# Patient Record
Sex: MALE | Race: WHITE | Employment: FULL TIME | URBAN - METROPOLITAN AREA
[De-identification: names, ages, dates, MRNs, and addresses within clinical notes are randomized per-mention and may not be internally consistent; named-entity substitution may affect disease eponyms.]

---

## 2017-01-05 ENCOUNTER — HOSPITAL ENCOUNTER (OUTPATIENT)
Dept: PREADMISSION TESTING | Age: 57
Discharge: HOME | End: 2017-01-05
Payer: COMMERCIAL

## 2017-01-05 VITALS
TEMPERATURE: 98.5 F | DIASTOLIC BLOOD PRESSURE: 82 MMHG | WEIGHT: 247 LBS | HEART RATE: 58 BPM | HEIGHT: 70 IN | SYSTOLIC BLOOD PRESSURE: 123 MMHG | BODY MASS INDEX: 35.36 KG/M2

## 2017-01-05 LAB
ABO + RH BLD: NORMAL
ANION GAP BLD CALC-SCNC: 6 MMOL/L (ref 5–15)
APPEARANCE UR: CLEAR
ATRIAL RATE: 58 BPM
BACTERIA URNS QL MICRO: NEGATIVE /HPF
BILIRUB UR QL: NEGATIVE
BLOOD GROUP ANTIBODIES SERPL: NORMAL
BUN SERPL-MCNC: 18 MG/DL (ref 6–20)
BUN/CREAT SERPL: 19 (ref 12–20)
CALCIUM SERPL-MCNC: 8.3 MG/DL (ref 8.5–10.1)
CALCULATED P AXIS, ECG09: 24 DEGREES
CALCULATED R AXIS, ECG10: 24 DEGREES
CALCULATED T AXIS, ECG11: 21 DEGREES
CHLORIDE SERPL-SCNC: 107 MMOL/L (ref 97–108)
CO2 SERPL-SCNC: 26 MMOL/L (ref 21–32)
COLOR UR: NORMAL
CREAT SERPL-MCNC: 0.93 MG/DL (ref 0.7–1.3)
DIAGNOSIS, 93000: NORMAL
EPITH CASTS URNS QL MICRO: NORMAL /LPF
ERYTHROCYTE [DISTWIDTH] IN BLOOD BY AUTOMATED COUNT: 14.6 % (ref 11.5–14.5)
EST. AVERAGE GLUCOSE BLD GHB EST-MCNC: 103 MG/DL
GLUCOSE SERPL-MCNC: 110 MG/DL (ref 65–100)
GLUCOSE UR STRIP.AUTO-MCNC: NEGATIVE MG/DL
HBA1C MFR BLD: 5.2 % (ref 4.2–6.3)
HCT VFR BLD AUTO: 45.6 % (ref 36.6–50.3)
HGB BLD-MCNC: 15.3 G/DL (ref 12.1–17)
HGB UR QL STRIP: NEGATIVE
HYALINE CASTS URNS QL MICRO: NORMAL /LPF (ref 0–5)
INR PPP: 1 (ref 0.9–1.1)
KETONES UR QL STRIP.AUTO: NEGATIVE MG/DL
LEUKOCYTE ESTERASE UR QL STRIP.AUTO: NEGATIVE
MCH RBC QN AUTO: 30.1 PG (ref 26–34)
MCHC RBC AUTO-ENTMCNC: 33.6 G/DL (ref 30–36.5)
MCV RBC AUTO: 89.8 FL (ref 80–99)
NITRITE UR QL STRIP.AUTO: NEGATIVE
P-R INTERVAL, ECG05: 136 MS
PH UR STRIP: 7 [PH] (ref 5–8)
PLATELET # BLD AUTO: 163 K/UL (ref 150–400)
POTASSIUM SERPL-SCNC: 4.1 MMOL/L (ref 3.5–5.1)
PROT UR STRIP-MCNC: NEGATIVE MG/DL
PROTHROMBIN TIME: 10.6 SEC (ref 9–11.1)
Q-T INTERVAL, ECG07: 420 MS
QRS DURATION, ECG06: 102 MS
QTC CALCULATION (BEZET), ECG08: 412 MS
RBC # BLD AUTO: 5.08 M/UL (ref 4.1–5.7)
RBC #/AREA URNS HPF: NORMAL /HPF (ref 0–5)
SODIUM SERPL-SCNC: 139 MMOL/L (ref 136–145)
SP GR UR REFRACTOMETRY: 1.02 (ref 1–1.03)
SPECIMEN EXP DATE BLD: NORMAL
UA: UC IF INDICATED,UAUC: NORMAL
UROBILINOGEN UR QL STRIP.AUTO: 0.2 EU/DL (ref 0.2–1)
VENTRICULAR RATE, ECG03: 58 BPM
WBC # BLD AUTO: 6 K/UL (ref 4.1–11.1)
WBC URNS QL MICRO: NORMAL /HPF (ref 0–4)

## 2017-01-05 PROCEDURE — 83036 HEMOGLOBIN GLYCOSYLATED A1C: CPT | Performed by: ORTHOPAEDIC SURGERY

## 2017-01-05 PROCEDURE — 85027 COMPLETE CBC AUTOMATED: CPT | Performed by: ORTHOPAEDIC SURGERY

## 2017-01-05 PROCEDURE — 36415 COLL VENOUS BLD VENIPUNCTURE: CPT | Performed by: ORTHOPAEDIC SURGERY

## 2017-01-05 PROCEDURE — 80048 BASIC METABOLIC PNL TOTAL CA: CPT | Performed by: ORTHOPAEDIC SURGERY

## 2017-01-05 PROCEDURE — 93005 ELECTROCARDIOGRAM TRACING: CPT

## 2017-01-05 PROCEDURE — 86900 BLOOD TYPING SEROLOGIC ABO: CPT | Performed by: ORTHOPAEDIC SURGERY

## 2017-01-05 PROCEDURE — 85610 PROTHROMBIN TIME: CPT | Performed by: ORTHOPAEDIC SURGERY

## 2017-01-05 PROCEDURE — 81001 URINALYSIS AUTO W/SCOPE: CPT | Performed by: ORTHOPAEDIC SURGERY

## 2017-01-05 RX ORDER — OMEGA-3-ACID ETHYL ESTERS 1 G/1
CAPSULE, LIQUID FILLED ORAL DAILY
COMMUNITY
End: 2017-01-25

## 2017-01-05 RX ORDER — IBUPROFEN 200 MG
600 TABLET ORAL
COMMUNITY
End: 2017-01-25

## 2017-01-05 RX ORDER — FAMOTIDINE 20 MG/1
20 TABLET, FILM COATED ORAL AS NEEDED
COMMUNITY
End: 2018-11-20

## 2017-01-05 NOTE — PERIOP NOTES
PREOPERATIVE INSTRUCTIONS REVIEWED WITH PATIENT. PATIENT GIVEN SIX PACKS OF CHG WIPES. INSTRUCTIONS TO BE REVIEWED IN CLASS ON USE OF CHG WIPES. PATIENT GIVEN SSI INFECTION SHEET AND ALSO  MRSA/MSSA TREATMENT INSTRUCTION SHEET  WITH AN EXPLANATION TO PATIENT THAT THEY WILL BE NOTIFIED IF TREATMENT INSTRUCTIONS NEED TO BE INITIATED. PATIENT WAS GIVEN THE OPPORTUNITY TO ASK QUESTIONS ON THE INFORMATION PROVIDED.

## 2017-01-06 LAB
BACTERIA SPEC CULT: NORMAL
BACTERIA SPEC CULT: NORMAL
SERVICE CMNT-IMP: NORMAL

## 2017-01-23 ENCOUNTER — ANESTHESIA EVENT (OUTPATIENT)
Dept: SURGERY | Age: 57
DRG: 470 | End: 2017-01-23
Payer: COMMERCIAL

## 2017-01-23 PROBLEM — M16.9 DEGENERATIVE JOINT DISEASE (DJD) OF HIP: Status: ACTIVE | Noted: 2017-01-23

## 2017-01-24 ENCOUNTER — APPOINTMENT (OUTPATIENT)
Dept: GENERAL RADIOLOGY | Age: 57
DRG: 470 | End: 2017-01-24
Attending: ORTHOPAEDIC SURGERY
Payer: COMMERCIAL

## 2017-01-24 ENCOUNTER — ANESTHESIA (OUTPATIENT)
Dept: SURGERY | Age: 57
DRG: 470 | End: 2017-01-24
Payer: COMMERCIAL

## 2017-01-24 ENCOUNTER — HOSPITAL ENCOUNTER (INPATIENT)
Age: 57
LOS: 1 days | Discharge: HOME HEALTH | DRG: 470 | End: 2017-01-25
Attending: ORTHOPAEDIC SURGERY | Admitting: ORTHOPAEDIC SURGERY
Payer: COMMERCIAL

## 2017-01-24 LAB
ABO + RH BLD: NORMAL
BLOOD GROUP ANTIBODIES SERPL: NORMAL
GLUCOSE BLD STRIP.AUTO-MCNC: 105 MG/DL (ref 65–100)
SERVICE CMNT-IMP: ABNORMAL
SPECIMEN EXP DATE BLD: NORMAL

## 2017-01-24 PROCEDURE — 77030026438 HC STYL ET INTUB CARD -A: Performed by: ANESTHESIOLOGY

## 2017-01-24 PROCEDURE — 73501 X-RAY EXAM HIP UNI 1 VIEW: CPT

## 2017-01-24 PROCEDURE — 76210000017 HC OR PH I REC 1.5 TO 2 HR: Performed by: ORTHOPAEDIC SURGERY

## 2017-01-24 PROCEDURE — 77030011640 HC PAD GRND REM COVD -A: Performed by: ORTHOPAEDIC SURGERY

## 2017-01-24 PROCEDURE — 77030013079 HC BLNKT BAIR HGGR 3M -A: Performed by: ANESTHESIOLOGY

## 2017-01-24 PROCEDURE — 74011250636 HC RX REV CODE- 250/636

## 2017-01-24 PROCEDURE — 74011000250 HC RX REV CODE- 250: Performed by: ORTHOPAEDIC SURGERY

## 2017-01-24 PROCEDURE — 36415 COLL VENOUS BLD VENIPUNCTURE: CPT | Performed by: ORTHOPAEDIC SURGERY

## 2017-01-24 PROCEDURE — 74011250636 HC RX REV CODE- 250/636: Performed by: PHYSICIAN ASSISTANT

## 2017-01-24 PROCEDURE — 82962 GLUCOSE BLOOD TEST: CPT

## 2017-01-24 PROCEDURE — 74011000250 HC RX REV CODE- 250: Performed by: PHYSICIAN ASSISTANT

## 2017-01-24 PROCEDURE — 74011250637 HC RX REV CODE- 250/637: Performed by: PHYSICIAN ASSISTANT

## 2017-01-24 PROCEDURE — 97161 PT EVAL LOW COMPLEX 20 MIN: CPT

## 2017-01-24 PROCEDURE — 77030032490 HC SLV COMPR SCD KNE COVD -B

## 2017-01-24 PROCEDURE — 74011000250 HC RX REV CODE- 250: Performed by: ANESTHESIOLOGY

## 2017-01-24 PROCEDURE — 0SR90JZ REPLACEMENT OF RIGHT HIP JOINT WITH SYNTHETIC SUBSTITUTE, OPEN APPROACH: ICD-10-PCS | Performed by: ORTHOPAEDIC SURGERY

## 2017-01-24 PROCEDURE — 77030018846 HC SOL IRR STRL H20 ICUM -A: Performed by: ORTHOPAEDIC SURGERY

## 2017-01-24 PROCEDURE — 77030006822 HC BLD SAW SAG BRSM -B: Performed by: ORTHOPAEDIC SURGERY

## 2017-01-24 PROCEDURE — 77030034850: Performed by: ORTHOPAEDIC SURGERY

## 2017-01-24 PROCEDURE — 77030035236 HC SUT PDS STRATFX BARB J&J -B: Performed by: ORTHOPAEDIC SURGERY

## 2017-01-24 PROCEDURE — C1776 JOINT DEVICE (IMPLANTABLE): HCPCS | Performed by: ORTHOPAEDIC SURGERY

## 2017-01-24 PROCEDURE — 77030033067 HC SUT PDO STRATFX SPIR J&J -B: Performed by: ORTHOPAEDIC SURGERY

## 2017-01-24 PROCEDURE — 77030020365 HC SOL INJ SOD CL 0.9% 50ML: Performed by: ORTHOPAEDIC SURGERY

## 2017-01-24 PROCEDURE — 74011000258 HC RX REV CODE- 258: Performed by: PHYSICIAN ASSISTANT

## 2017-01-24 PROCEDURE — 77030031139 HC SUT VCRL2 J&J -A: Performed by: ORTHOPAEDIC SURGERY

## 2017-01-24 PROCEDURE — 65270000029 HC RM PRIVATE

## 2017-01-24 PROCEDURE — 77030018836 HC SOL IRR NACL ICUM -A: Performed by: ORTHOPAEDIC SURGERY

## 2017-01-24 PROCEDURE — C9290 INJ, BUPIVACAINE LIPOSOME: HCPCS | Performed by: PHYSICIAN ASSISTANT

## 2017-01-24 PROCEDURE — 77030012935 HC DRSG AQUACEL BMS -B: Performed by: ORTHOPAEDIC SURGERY

## 2017-01-24 PROCEDURE — 74011250636 HC RX REV CODE- 250/636: Performed by: ANESTHESIOLOGY

## 2017-01-24 PROCEDURE — 76000 FLUOROSCOPY <1 HR PHYS/QHP: CPT

## 2017-01-24 PROCEDURE — 86900 BLOOD TYPING SEROLOGIC ABO: CPT | Performed by: ORTHOPAEDIC SURGERY

## 2017-01-24 PROCEDURE — 77030002933 HC SUT MCRYL J&J -A: Performed by: ORTHOPAEDIC SURGERY

## 2017-01-24 PROCEDURE — 76060000036 HC ANESTHESIA 2.5 TO 3 HR: Performed by: ORTHOPAEDIC SURGERY

## 2017-01-24 PROCEDURE — 74011250637 HC RX REV CODE- 250/637: Performed by: ANESTHESIOLOGY

## 2017-01-24 PROCEDURE — 76010000172 HC OR TIME 2.5 TO 3 HR INTENSV-TIER 1: Performed by: ORTHOPAEDIC SURGERY

## 2017-01-24 PROCEDURE — 77030008684 HC TU ET CUF COVD -B: Performed by: ANESTHESIOLOGY

## 2017-01-24 PROCEDURE — 74011250636 HC RX REV CODE- 250/636: Performed by: ORTHOPAEDIC SURGERY

## 2017-01-24 PROCEDURE — 74011000250 HC RX REV CODE- 250

## 2017-01-24 PROCEDURE — 77030012890

## 2017-01-24 PROCEDURE — 97116 GAIT TRAINING THERAPY: CPT

## 2017-01-24 PROCEDURE — 77030034479 HC ADH SKN CLSR PRINEO J&J -B: Performed by: ORTHOPAEDIC SURGERY

## 2017-01-24 RX ORDER — PREGABALIN 75 MG/1
75 CAPSULE ORAL ONCE
Status: COMPLETED | OUTPATIENT
Start: 2017-01-24 | End: 2017-01-24

## 2017-01-24 RX ORDER — DEXAMETHASONE SODIUM PHOSPHATE 4 MG/ML
INJECTION, SOLUTION INTRA-ARTICULAR; INTRALESIONAL; INTRAMUSCULAR; INTRAVENOUS; SOFT TISSUE AS NEEDED
Status: DISCONTINUED | OUTPATIENT
Start: 2017-01-24 | End: 2017-01-24 | Stop reason: HOSPADM

## 2017-01-24 RX ORDER — ONDANSETRON 2 MG/ML
4 INJECTION INTRAMUSCULAR; INTRAVENOUS ONCE
Status: COMPLETED | OUTPATIENT
Start: 2017-01-24 | End: 2017-01-24

## 2017-01-24 RX ORDER — SODIUM CHLORIDE 9 MG/ML
25 INJECTION, SOLUTION INTRAVENOUS CONTINUOUS
Status: DISCONTINUED | OUTPATIENT
Start: 2017-01-24 | End: 2017-01-24 | Stop reason: HOSPADM

## 2017-01-24 RX ORDER — CEFAZOLIN SODIUM IN 0.9 % NACL 2 G/50 ML
2 INTRAVENOUS SOLUTION, PIGGYBACK (ML) INTRAVENOUS EVERY 8 HOURS
Status: COMPLETED | OUTPATIENT
Start: 2017-01-24 | End: 2017-01-24

## 2017-01-24 RX ORDER — SODIUM CHLORIDE 0.9 % (FLUSH) 0.9 %
5-10 SYRINGE (ML) INJECTION EVERY 8 HOURS
Status: DISCONTINUED | OUTPATIENT
Start: 2017-01-25 | End: 2017-01-25 | Stop reason: HOSPADM

## 2017-01-24 RX ORDER — MIDAZOLAM HYDROCHLORIDE 1 MG/ML
INJECTION, SOLUTION INTRAMUSCULAR; INTRAVENOUS AS NEEDED
Status: DISCONTINUED | OUTPATIENT
Start: 2017-01-24 | End: 2017-01-24 | Stop reason: HOSPADM

## 2017-01-24 RX ORDER — DIPHENHYDRAMINE HYDROCHLORIDE 50 MG/ML
12.5 INJECTION, SOLUTION INTRAMUSCULAR; INTRAVENOUS AS NEEDED
Status: DISCONTINUED | OUTPATIENT
Start: 2017-01-24 | End: 2017-01-24 | Stop reason: HOSPADM

## 2017-01-24 RX ORDER — MIDAZOLAM HYDROCHLORIDE 1 MG/ML
0.5 INJECTION, SOLUTION INTRAMUSCULAR; INTRAVENOUS
Status: DISCONTINUED | OUTPATIENT
Start: 2017-01-24 | End: 2017-01-24 | Stop reason: HOSPADM

## 2017-01-24 RX ORDER — PROPOFOL 10 MG/ML
INJECTION, EMULSION INTRAVENOUS AS NEEDED
Status: DISCONTINUED | OUTPATIENT
Start: 2017-01-24 | End: 2017-01-24 | Stop reason: HOSPADM

## 2017-01-24 RX ORDER — OXYCODONE HYDROCHLORIDE 5 MG/1
5 TABLET ORAL
Status: DISCONTINUED | OUTPATIENT
Start: 2017-01-24 | End: 2017-01-25 | Stop reason: HOSPADM

## 2017-01-24 RX ORDER — SODIUM CHLORIDE 0.9 % (FLUSH) 0.9 %
5-10 SYRINGE (ML) INJECTION AS NEEDED
Status: DISCONTINUED | OUTPATIENT
Start: 2017-01-24 | End: 2017-01-25 | Stop reason: HOSPADM

## 2017-01-24 RX ORDER — SODIUM CHLORIDE, SODIUM LACTATE, POTASSIUM CHLORIDE, CALCIUM CHLORIDE 600; 310; 30; 20 MG/100ML; MG/100ML; MG/100ML; MG/100ML
INJECTION, SOLUTION INTRAVENOUS
Status: DISCONTINUED | OUTPATIENT
Start: 2017-01-24 | End: 2017-01-24 | Stop reason: HOSPADM

## 2017-01-24 RX ORDER — ONDANSETRON 2 MG/ML
4 INJECTION INTRAMUSCULAR; INTRAVENOUS
Status: DISCONTINUED | OUTPATIENT
Start: 2017-01-24 | End: 2017-01-25 | Stop reason: HOSPADM

## 2017-01-24 RX ORDER — ACETAMINOPHEN 325 MG/1
650 TABLET ORAL EVERY 6 HOURS
Status: DISCONTINUED | OUTPATIENT
Start: 2017-01-24 | End: 2017-01-25 | Stop reason: HOSPADM

## 2017-01-24 RX ORDER — HYDROXYZINE HYDROCHLORIDE 10 MG/1
10 TABLET, FILM COATED ORAL
Status: DISCONTINUED | OUTPATIENT
Start: 2017-01-24 | End: 2017-01-25 | Stop reason: HOSPADM

## 2017-01-24 RX ORDER — MIDAZOLAM HYDROCHLORIDE 1 MG/ML
1 INJECTION, SOLUTION INTRAMUSCULAR; INTRAVENOUS AS NEEDED
Status: DISCONTINUED | OUTPATIENT
Start: 2017-01-24 | End: 2017-01-24 | Stop reason: HOSPADM

## 2017-01-24 RX ORDER — MORPHINE SULFATE 10 MG/ML
2 INJECTION, SOLUTION INTRAMUSCULAR; INTRAVENOUS
Status: DISCONTINUED | OUTPATIENT
Start: 2017-01-24 | End: 2017-01-24 | Stop reason: HOSPADM

## 2017-01-24 RX ORDER — OXYCODONE HYDROCHLORIDE 5 MG/1
10 TABLET ORAL
Status: DISCONTINUED | OUTPATIENT
Start: 2017-01-24 | End: 2017-01-25 | Stop reason: HOSPADM

## 2017-01-24 RX ORDER — ROCURONIUM BROMIDE 10 MG/ML
INJECTION, SOLUTION INTRAVENOUS AS NEEDED
Status: DISCONTINUED | OUTPATIENT
Start: 2017-01-24 | End: 2017-01-24 | Stop reason: HOSPADM

## 2017-01-24 RX ORDER — PROCHLORPERAZINE EDISYLATE 5 MG/ML
INJECTION INTRAMUSCULAR; INTRAVENOUS
Status: COMPLETED
Start: 2017-01-24 | End: 2017-01-24

## 2017-01-24 RX ORDER — GLYCOPYRROLATE 0.2 MG/ML
INJECTION INTRAMUSCULAR; INTRAVENOUS AS NEEDED
Status: DISCONTINUED | OUTPATIENT
Start: 2017-01-24 | End: 2017-01-24 | Stop reason: HOSPADM

## 2017-01-24 RX ORDER — SODIUM CHLORIDE 0.9 % (FLUSH) 0.9 %
5-10 SYRINGE (ML) INJECTION EVERY 8 HOURS
Status: DISCONTINUED | OUTPATIENT
Start: 2017-01-24 | End: 2017-01-24 | Stop reason: HOSPADM

## 2017-01-24 RX ORDER — LIDOCAINE HYDROCHLORIDE 20 MG/ML
INJECTION, SOLUTION EPIDURAL; INFILTRATION; INTRACAUDAL; PERINEURAL AS NEEDED
Status: DISCONTINUED | OUTPATIENT
Start: 2017-01-24 | End: 2017-01-24 | Stop reason: HOSPADM

## 2017-01-24 RX ORDER — CEFAZOLIN SODIUM IN 0.9 % NACL 2 G/50 ML
2 INTRAVENOUS SOLUTION, PIGGYBACK (ML) INTRAVENOUS ONCE
Status: COMPLETED | OUTPATIENT
Start: 2017-01-24 | End: 2017-01-24

## 2017-01-24 RX ORDER — FENTANYL CITRATE 50 UG/ML
50 INJECTION, SOLUTION INTRAMUSCULAR; INTRAVENOUS AS NEEDED
Status: DISCONTINUED | OUTPATIENT
Start: 2017-01-24 | End: 2017-01-24 | Stop reason: HOSPADM

## 2017-01-24 RX ORDER — SODIUM CHLORIDE, SODIUM LACTATE, POTASSIUM CHLORIDE, CALCIUM CHLORIDE 600; 310; 30; 20 MG/100ML; MG/100ML; MG/100ML; MG/100ML
125 INJECTION, SOLUTION INTRAVENOUS CONTINUOUS
Status: DISCONTINUED | OUTPATIENT
Start: 2017-01-24 | End: 2017-01-24 | Stop reason: HOSPADM

## 2017-01-24 RX ORDER — LIDOCAINE HYDROCHLORIDE 10 MG/ML
0.1 INJECTION, SOLUTION EPIDURAL; INFILTRATION; INTRACAUDAL; PERINEURAL AS NEEDED
Status: DISCONTINUED | OUTPATIENT
Start: 2017-01-24 | End: 2017-01-24 | Stop reason: HOSPADM

## 2017-01-24 RX ORDER — ACETAMINOPHEN 500 MG
1000 TABLET ORAL ONCE
Status: COMPLETED | OUTPATIENT
Start: 2017-01-24 | End: 2017-01-24

## 2017-01-24 RX ORDER — ONDANSETRON 2 MG/ML
4 INJECTION INTRAMUSCULAR; INTRAVENOUS AS NEEDED
Status: DISCONTINUED | OUTPATIENT
Start: 2017-01-24 | End: 2017-01-24 | Stop reason: HOSPADM

## 2017-01-24 RX ORDER — HYDROMORPHONE HYDROCHLORIDE 1 MG/ML
0.2 INJECTION, SOLUTION INTRAMUSCULAR; INTRAVENOUS; SUBCUTANEOUS
Status: DISCONTINUED | OUTPATIENT
Start: 2017-01-24 | End: 2017-01-24 | Stop reason: HOSPADM

## 2017-01-24 RX ORDER — ONDANSETRON 2 MG/ML
INJECTION INTRAMUSCULAR; INTRAVENOUS AS NEEDED
Status: DISCONTINUED | OUTPATIENT
Start: 2017-01-24 | End: 2017-01-24 | Stop reason: HOSPADM

## 2017-01-24 RX ORDER — OXYCODONE AND ACETAMINOPHEN 5; 325 MG/1; MG/1
1 TABLET ORAL AS NEEDED
Status: DISCONTINUED | OUTPATIENT
Start: 2017-01-24 | End: 2017-01-24 | Stop reason: HOSPADM

## 2017-01-24 RX ORDER — FACIAL-BODY WIPES
10 EACH TOPICAL DAILY PRN
Status: DISCONTINUED | OUTPATIENT
Start: 2017-01-26 | End: 2017-01-25 | Stop reason: HOSPADM

## 2017-01-24 RX ORDER — SODIUM CHLORIDE 0.9 % (FLUSH) 0.9 %
5-10 SYRINGE (ML) INJECTION AS NEEDED
Status: DISCONTINUED | OUTPATIENT
Start: 2017-01-24 | End: 2017-01-24 | Stop reason: HOSPADM

## 2017-01-24 RX ORDER — DEXAMETHASONE SODIUM PHOSPHATE 100 MG/10ML
4-8 INJECTION INTRAMUSCULAR; INTRAVENOUS ONCE
Status: DISCONTINUED | OUTPATIENT
Start: 2017-01-24 | End: 2017-01-24 | Stop reason: HOSPADM

## 2017-01-24 RX ORDER — CETIRIZINE HCL 10 MG
10 TABLET ORAL DAILY
Status: DISCONTINUED | OUTPATIENT
Start: 2017-01-24 | End: 2017-01-25 | Stop reason: HOSPADM

## 2017-01-24 RX ORDER — DEXAMETHASONE SODIUM PHOSPHATE 4 MG/ML
10 INJECTION, SOLUTION INTRA-ARTICULAR; INTRALESIONAL; INTRAMUSCULAR; INTRAVENOUS; SOFT TISSUE ONCE
Status: COMPLETED | OUTPATIENT
Start: 2017-01-25 | End: 2017-01-25

## 2017-01-24 RX ORDER — NALOXONE HYDROCHLORIDE 0.4 MG/ML
0.4 INJECTION, SOLUTION INTRAMUSCULAR; INTRAVENOUS; SUBCUTANEOUS AS NEEDED
Status: DISCONTINUED | OUTPATIENT
Start: 2017-01-24 | End: 2017-01-25 | Stop reason: HOSPADM

## 2017-01-24 RX ORDER — FENTANYL CITRATE 50 UG/ML
INJECTION, SOLUTION INTRAMUSCULAR; INTRAVENOUS AS NEEDED
Status: DISCONTINUED | OUTPATIENT
Start: 2017-01-24 | End: 2017-01-24 | Stop reason: HOSPADM

## 2017-01-24 RX ORDER — KETOROLAC TROMETHAMINE 30 MG/ML
30 INJECTION, SOLUTION INTRAMUSCULAR; INTRAVENOUS EVERY 6 HOURS
Status: DISCONTINUED | OUTPATIENT
Start: 2017-01-24 | End: 2017-01-25 | Stop reason: HOSPADM

## 2017-01-24 RX ORDER — CELECOXIB 200 MG/1
200 CAPSULE ORAL ONCE
Status: COMPLETED | OUTPATIENT
Start: 2017-01-24 | End: 2017-01-24

## 2017-01-24 RX ORDER — NEOSTIGMINE METHYLSULFATE 1 MG/ML
INJECTION INTRAVENOUS AS NEEDED
Status: DISCONTINUED | OUTPATIENT
Start: 2017-01-24 | End: 2017-01-24 | Stop reason: HOSPADM

## 2017-01-24 RX ORDER — SODIUM CHLORIDE 9 MG/ML
INJECTION, SOLUTION INTRAVENOUS
Status: DISCONTINUED | OUTPATIENT
Start: 2017-01-24 | End: 2017-01-24 | Stop reason: HOSPADM

## 2017-01-24 RX ORDER — FENTANYL CITRATE 50 UG/ML
25 INJECTION, SOLUTION INTRAMUSCULAR; INTRAVENOUS
Status: COMPLETED | OUTPATIENT
Start: 2017-01-24 | End: 2017-01-24

## 2017-01-24 RX ORDER — HYDROMORPHONE HYDROCHLORIDE 1 MG/ML
INJECTION, SOLUTION INTRAMUSCULAR; INTRAVENOUS; SUBCUTANEOUS AS NEEDED
Status: DISCONTINUED | OUTPATIENT
Start: 2017-01-24 | End: 2017-01-24 | Stop reason: HOSPADM

## 2017-01-24 RX ORDER — HYDROMORPHONE HYDROCHLORIDE 1 MG/ML
0.5 INJECTION, SOLUTION INTRAMUSCULAR; INTRAVENOUS; SUBCUTANEOUS
Status: DISCONTINUED | OUTPATIENT
Start: 2017-01-24 | End: 2017-01-25 | Stop reason: HOSPADM

## 2017-01-24 RX ORDER — POLYETHYLENE GLYCOL 3350 17 G/17G
17 POWDER, FOR SOLUTION ORAL DAILY
Status: DISCONTINUED | OUTPATIENT
Start: 2017-01-25 | End: 2017-01-25 | Stop reason: HOSPADM

## 2017-01-24 RX ORDER — AMOXICILLIN 250 MG
1 CAPSULE ORAL 2 TIMES DAILY
Status: DISCONTINUED | OUTPATIENT
Start: 2017-01-24 | End: 2017-01-25 | Stop reason: HOSPADM

## 2017-01-24 RX ORDER — SODIUM CHLORIDE 9 MG/ML
125 INJECTION, SOLUTION INTRAVENOUS CONTINUOUS
Status: DISPENSED | OUTPATIENT
Start: 2017-01-24 | End: 2017-01-25

## 2017-01-24 RX ORDER — SUCCINYLCHOLINE CHLORIDE 20 MG/ML
INJECTION INTRAMUSCULAR; INTRAVENOUS AS NEEDED
Status: DISCONTINUED | OUTPATIENT
Start: 2017-01-24 | End: 2017-01-24 | Stop reason: HOSPADM

## 2017-01-24 RX ORDER — TRANEXAMIC ACID 100 MG/ML
INJECTION, SOLUTION INTRAVENOUS AS NEEDED
Status: DISCONTINUED | OUTPATIENT
Start: 2017-01-24 | End: 2017-01-24 | Stop reason: HOSPADM

## 2017-01-24 RX ORDER — ASPIRIN 325 MG
325 TABLET, DELAYED RELEASE (ENTERIC COATED) ORAL 2 TIMES DAILY
Status: DISCONTINUED | OUTPATIENT
Start: 2017-01-24 | End: 2017-01-25 | Stop reason: HOSPADM

## 2017-01-24 RX ADMIN — SUCCINYLCHOLINE CHLORIDE 140 MG: 20 INJECTION INTRAMUSCULAR; INTRAVENOUS at 07:36

## 2017-01-24 RX ADMIN — FENTANYL CITRATE 25 MCG: 50 INJECTION, SOLUTION INTRAMUSCULAR; INTRAVENOUS at 11:30

## 2017-01-24 RX ADMIN — CEFAZOLIN 2 G: 1 INJECTION, POWDER, FOR SOLUTION INTRAMUSCULAR; INTRAVENOUS; PARENTERAL at 23:11

## 2017-01-24 RX ADMIN — LIDOCAINE HYDROCHLORIDE 60 MG: 20 INJECTION, SOLUTION EPIDURAL; INFILTRATION; INTRACAUDAL; PERINEURAL at 07:36

## 2017-01-24 RX ADMIN — SODIUM CHLORIDE: 9 INJECTION, SOLUTION INTRAVENOUS at 09:04

## 2017-01-24 RX ADMIN — GLYCOPYRROLATE 0.4 MG: 0.2 INJECTION INTRAMUSCULAR; INTRAVENOUS at 09:58

## 2017-01-24 RX ADMIN — ROCURONIUM BROMIDE 50 MG: 10 INJECTION, SOLUTION INTRAVENOUS at 07:42

## 2017-01-24 RX ADMIN — PREGABALIN 75 MG: 75 CAPSULE ORAL at 07:15

## 2017-01-24 RX ADMIN — HYDROMORPHONE HYDROCHLORIDE 1 MG: 1 INJECTION, SOLUTION INTRAMUSCULAR; INTRAVENOUS; SUBCUTANEOUS at 08:10

## 2017-01-24 RX ADMIN — PROPOFOL 200 MG: 10 INJECTION, EMULSION INTRAVENOUS at 07:36

## 2017-01-24 RX ADMIN — DOCUSATE SODIUM AND SENNOSIDES 1 TABLET: 8.6; 5 TABLET, FILM COATED ORAL at 17:41

## 2017-01-24 RX ADMIN — FENTANYL CITRATE 25 MCG: 50 INJECTION, SOLUTION INTRAMUSCULAR; INTRAVENOUS at 11:25

## 2017-01-24 RX ADMIN — DEXAMETHASONE SODIUM PHOSPHATE 10 MG: 4 INJECTION, SOLUTION INTRA-ARTICULAR; INTRALESIONAL; INTRAMUSCULAR; INTRAVENOUS; SOFT TISSUE at 07:45

## 2017-01-24 RX ADMIN — CELECOXIB 200 MG: 200 CAPSULE ORAL at 07:15

## 2017-01-24 RX ADMIN — ONDANSETRON 4 MG: 2 INJECTION INTRAMUSCULAR; INTRAVENOUS at 07:45

## 2017-01-24 RX ADMIN — SODIUM CHLORIDE 125 ML/HR: 900 INJECTION, SOLUTION INTRAVENOUS at 10:33

## 2017-01-24 RX ADMIN — SODIUM CHLORIDE, SODIUM LACTATE, POTASSIUM CHLORIDE, CALCIUM CHLORIDE: 600; 310; 30; 20 INJECTION, SOLUTION INTRAVENOUS at 07:28

## 2017-01-24 RX ADMIN — ACETAMINOPHEN 650 MG: 325 TABLET, FILM COATED ORAL at 23:11

## 2017-01-24 RX ADMIN — SODIUM CHLORIDE, SODIUM LACTATE, POTASSIUM CHLORIDE, CALCIUM CHLORIDE: 600; 310; 30; 20 INJECTION, SOLUTION INTRAVENOUS at 09:28

## 2017-01-24 RX ADMIN — ONDANSETRON 4 MG: 2 INJECTION INTRAMUSCULAR; INTRAVENOUS at 10:15

## 2017-01-24 RX ADMIN — ROCURONIUM BROMIDE 10 MG: 10 INJECTION, SOLUTION INTRAVENOUS at 09:15

## 2017-01-24 RX ADMIN — KETOROLAC TROMETHAMINE 30 MG: 30 INJECTION, SOLUTION INTRAMUSCULAR at 23:11

## 2017-01-24 RX ADMIN — PROCHLORPERAZINE EDISYLATE 10 MG: 5 INJECTION INTRAMUSCULAR; INTRAVENOUS at 10:33

## 2017-01-24 RX ADMIN — ONDANSETRON 4 MG: 2 INJECTION INTRAMUSCULAR; INTRAVENOUS at 07:18

## 2017-01-24 RX ADMIN — ACETAMINOPHEN 650 MG: 325 TABLET, FILM COATED ORAL at 17:40

## 2017-01-24 RX ADMIN — ROCURONIUM BROMIDE 10 MG: 10 INJECTION, SOLUTION INTRAVENOUS at 08:47

## 2017-01-24 RX ADMIN — ACETAMINOPHEN 1000 MG: 500 TABLET, FILM COATED ORAL at 07:15

## 2017-01-24 RX ADMIN — NEOSTIGMINE METHYLSULFATE 4 MG: 1 INJECTION INTRAVENOUS at 09:59

## 2017-01-24 RX ADMIN — CEFAZOLIN 2 G: 1 INJECTION, POWDER, FOR SOLUTION INTRAMUSCULAR; INTRAVENOUS; PARENTERAL at 15:50

## 2017-01-24 RX ADMIN — PROCHLORPERAZINE EDISYLATE 5 MG: 5 INJECTION INTRAMUSCULAR; INTRAVENOUS at 10:40

## 2017-01-24 RX ADMIN — CEFAZOLIN 2 G: 1 INJECTION, POWDER, FOR SOLUTION INTRAMUSCULAR; INTRAVENOUS; PARENTERAL at 07:49

## 2017-01-24 RX ADMIN — FENTANYL CITRATE 100 MCG: 50 INJECTION, SOLUTION INTRAMUSCULAR; INTRAVENOUS at 07:36

## 2017-01-24 RX ADMIN — ONDANSETRON 4 MG: 2 INJECTION INTRAMUSCULAR; INTRAVENOUS at 17:40

## 2017-01-24 RX ADMIN — ASPIRIN 325 MG: 325 TABLET, DELAYED RELEASE ORAL at 17:41

## 2017-01-24 RX ADMIN — FENTANYL CITRATE 25 MCG: 50 INJECTION, SOLUTION INTRAMUSCULAR; INTRAVENOUS at 11:40

## 2017-01-24 RX ADMIN — ONDANSETRON 4 MG: 2 INJECTION INTRAMUSCULAR; INTRAVENOUS at 13:35

## 2017-01-24 RX ADMIN — ROCURONIUM BROMIDE 20 MG: 10 INJECTION, SOLUTION INTRAVENOUS at 08:14

## 2017-01-24 RX ADMIN — SODIUM CHLORIDE, SODIUM LACTATE, POTASSIUM CHLORIDE, CALCIUM CHLORIDE: 600; 310; 30; 20 INJECTION, SOLUTION INTRAVENOUS at 08:15

## 2017-01-24 RX ADMIN — MIDAZOLAM HYDROCHLORIDE 2 MG: 1 INJECTION, SOLUTION INTRAMUSCULAR; INTRAVENOUS at 07:28

## 2017-01-24 RX ADMIN — KETOROLAC TROMETHAMINE 30 MG: 30 INJECTION, SOLUTION INTRAMUSCULAR at 17:41

## 2017-01-24 RX ADMIN — FENTANYL CITRATE 25 MCG: 50 INJECTION, SOLUTION INTRAMUSCULAR; INTRAVENOUS at 11:45

## 2017-01-24 NOTE — ANESTHESIA PREPROCEDURE EVALUATION
Anesthetic History   No history of anesthetic complications            Review of Systems / Medical History  Patient summary reviewed, nursing notes reviewed and pertinent labs reviewed    Pulmonary        Sleep apnea: CPAP           Neuro/Psych   Within defined limits           Cardiovascular  Within defined limits                Exercise tolerance: >4 METS     GI/Hepatic/Renal     GERD: well controlled           Endo/Other        Arthritis     Other Findings              Physical Exam    Airway  Mallampati: III  TM Distance: > 6 cm  Neck ROM: normal range of motion   Mouth opening: Normal     Cardiovascular  Regular rate and rhythm,  S1 and S2 normal,  no murmur, click, rub, or gallop             Dental  No notable dental hx       Pulmonary  Breath sounds clear to auscultation               Abdominal  GI exam deferred       Other Findings            Anesthetic Plan    ASA: 2  Anesthesia type: general          Induction: Intravenous  Anesthetic plan and risks discussed with: Patient

## 2017-01-24 NOTE — PROGRESS NOTES
Problem: Mobility Impaired (Adult and Pediatric)  Goal: *Acute Goals and Plan of Care (Insert Text)  Physical Therapy Goals  Initiated 1/24/2017    1. Patient will move from supine to sit and sit to supine in bed with modified independence within 4 days. 2. Patient will perform sit to stand with modified independence within 4 days. 3. Patient will ambulate with modified independence for 250 feet with the least restrictive device within 4 days. 4. Patient will ascend/descend 2 stairs without use of handrail(s) with modified independence within 4 days. 5. Patient will perform hip home exercise program per protocol with independence within 4 days. PHYSICAL THERAPY EVALUATION  Patient: Luh Vásquez (7 y.o. male)  Date: 1/24/2017  Primary Diagnosis: OSTEO ARTHRITIS RIGHT HIP  Degenerative joint disease (DJD) of hip  Procedure(s) (LRB):  RIGHT TOTAL HIP ARTHROPLASTY ANTERIOR APPROACH (Right) Day of Surgery   Precautions:   Fall, WBAT (No extreme motions)      ASSESSMENT :  Based on the objective data described below, the patient presents with decreased R hip ROM/strength, nausea/dry heaves and decreased functional independence compared to his baseline. He was able to come to EOB with supervision and stand SBA to ambulate 50 ft with RW. Pt noted to minimally use walker for support and wishes to attempt crutches next session. Anticipate he will progress well with therapy and should be able to return home with HHPT     Patient will benefit from skilled intervention to address the above impairments.   Patients rehabilitation potential is considered to be Good  Factors which may influence rehabilitation potential include:   [X]         None noted  [ ]         Mental ability/status  [ ]         Medical condition  [ ]         Home/family situation and support systems  [ ]         Safety awareness  [ ]         Pain tolerance/management  [ ]         Other:        PLAN :  Recommendations and Planned Interventions:  [X] Bed Mobility Training             [ ]    Neuromuscular Re-Education  [X]           Transfer Training                   [ ]    Orthotic/Prosthetic Training  [X]           Gait Training                         [ ]    Modalities  [X]           Therapeutic Exercises           [ ]    Edema Management/Control  [X]           Therapeutic Activities            [X]    Patient and Family Training/Education  [ ]           Other (comment):     Frequency/Duration: Patient will be followed by physical therapy  twice daily to address goals. Discharge Recommendations: Home Health  Further Equipment Recommendations for Discharge: none       SUBJECTIVE:   Patient stated [facility] do I get rid of the dry heaves? Artice Heal      OBJECTIVE DATA SUMMARY:   HISTORY:    Past Medical History   Diagnosis Date    Chronic pain      Coagulation disorder (HCC)         HX ANEMIA    GERD (gastroesophageal reflux disease)         OCCASIONAL    Sleep apnea         CPAP     Past Surgical History   Procedure Laterality Date    Hx heent           WISDOM TEETH    Hx colonoscopy        Hx endoscopy         Prior Level of Function/Home Situation: independent  Personal factors and/or comorbidities impacting plan of care:      Home Situation  Home Environment: Private residence  # Steps to Enter: 2  Rails to Enter: No  One/Two Story Residence: One story  Living Alone: No  Support Systems: Spouse/Significant Other/Partner  Patient Expects to be Discharged to[de-identified] Private residence  Current DME Used/Available at Home: [address], straight, Walker, rolling     EXAMINATION/PRESENTATION/DECISION MAKING:   Critical Behavior:  Neurologic State: Alert  Orientation Level: Oriented X4  Cognition: Appropriate decision making, Appropriate for age attention/concentration, Follows commands  Safety/Judgement: Awareness of environment  Skin:  Appears intact  Strength:    Strength: Generally decreased, functional                    Tone & Sensation:   Tone: Normal Sensation: Intact               Range Of Motion:  AROM: Generally decreased, functional                       Coordination:  Coordination: Within functional limits     Functional Mobility:  Bed Mobility:     Supine to Sit: Supervision  Sit to Supine: Supervision     Transfers:  Sit to Stand: Stand-by asssistance  Stand to Sit: Stand-by asssistance                       Balance:   Sitting: Intact  Standing: Intact; With support  Ambulation/Gait Training:  Distance (ft): 50 Feet (ft)  Assistive Device: Walker, rolling;Gait belt (pt minimally using RW for support)  Ambulation - Level of Assistance: Stand-by asssistance     Gait Description (WDL): Exceptions to WDL     Right Side Weight Bearing: As tolerated        Pain:  Pain Scale 1: Numeric (0 - 10)  Pain Intensity 1: 1 (patient stated pain tolerable)  Pain Location 1: Hip  Pain Orientation 1: Right  Pain Description 1: Dull  Pain Intervention(s) 1: Ice;Repositioned; Rest  Activity Tolerance:   No apparent distress besides nausea  Please refer to the flowsheet for vital signs taken during this treatment. After treatment:   [ ]         Patient left in no apparent distress sitting up in chair  [X]         Patient left in no apparent distress in bed  [X]         Call bell left within reach  [X]         Nursing notified  [ ]         Caregiver present  [ ]         Bed alarm activated      COMMUNICATION/EDUCATION:   The patients plan of care was discussed with: Registered Nurse.  [X]         Fall prevention education was provided and the patient/caregiver indicated understanding. [X]         Patient/family have participated as able in goal setting and plan of care. [X]         Patient/family agree to work toward stated goals and plan of care. [ ]         Patient understands intent and goals of therapy, but is neutral about his/her participation. [ ]         Patient is unable to participate in goal setting and plan of care.      Thank you for this referral.  Presley Morales, PT   Time Calculation: 20 mins

## 2017-01-24 NOTE — PROGRESS NOTES
Patient has arrived; took vital signs, did nursing assessment; released orders, taught incentive spirometer, did dual skin check

## 2017-01-24 NOTE — PROGRESS NOTES
TRANSFER - IN REPORT:    Verbal report received from [place], [facility]) on Mike Beth  being received from PACU for routine post - op      Report consisted of patients Situation, Background, Assessment and   Recommendations(SBAR). Information from the following report(s) SBAR, Kardex, OR Summary and MAR was reviewed with the receiving nurse. Opportunity for questions and clarification was provided. Assessment completed upon patients arrival to unit and care assumed.

## 2017-01-24 NOTE — PROGRESS NOTES
Primary Nurse Qiana Santana RN and Mark Grant RN performed a dual skin assessment on this patient No impairment noted  Anders score is 21

## 2017-01-24 NOTE — PROGRESS NOTES
TRANSFER - OUT REPORT:    Verbal report given to FRANKIE Oakley(name) on Lavern Dykes  being transferred to 566(unit) for routine post - op       Report consisted of patients Situation, Background, Assessment and   Recommendations(SBAR). Time Pre op antibiotic given:7:49 am  Anesthesia Stop time: 10:19 am    Information from the following report(s) SBAR, Kardex, OR Summary, Procedure Summary, Intake/Output and MAR was reviewed with the receiving nurse. Opportunity for questions and clarification was provided. Is the patient on 02? YES       L/Min 2       Other     Is the patient on a monitor? NO    Is the nurse transporting with the patient? NO    Surgical Waiting Area notified of patient's transfer from PACU?  YES      The following personal items collected during your admission accompanied patient upon transfer:   Dental Appliance: Dental Appliances: None  Vision:    Hearing Aid:    Jewelry:    Clothing: Clothing: Other (comment) (bag of clothes returned to patient)  Other Valuables:    Valuables sent to safe:

## 2017-01-24 NOTE — IP AVS SNAPSHOT
[address] 
[phone] Patient: Valdo Levine MRN: [id number] [facility]:[birth year] You are allergic to the following No active allergies Recent Documentation Height Weight BMI Smoking Status 1.778 m 112 kg 35.44 kg/m2 Never Smoker Emergency Contacts Name Discharge Info Relation Home Work Mobile Adela Blandon DISCHARGE CAREGIVER [3] Spouse [3] [phone] About your hospitalization You were admitted on:  January 24, 2017 [facility]:  January 25, 2017 Unit phone number:  [phone] Why you were hospitalized Your primary diagnosis was:  Degenerative Joint Disease (Djd) Of Hip Providers Seen During Your Hospitalizations Provider Role Specialty Primary office phone Kelli Cantu MD Attending Provider Orthopedic Surgery [phone] Your Primary Care Physician (PCP) Primary Care Physician Office Phone Office Fax [place], [facility] [phone] Follow-up Information Follow up With Details Comments Contact Info MD KRYSTAL Grant/ [place] [facility] [address] [place] [address] 
[phone] AT HOME CARE On 1/26/2017 For Home Health. Please contact agency if you not from them by 12:00 pm the first full day after discharge. [address] [place] [address] 
[phone] Current Discharge Medication List  
  
START taking these medications Dose & Instructions Dispensing Information Comments Morning Noon Evening Bedtime  
 aspirin delayed-release 325 mg tablet Your next dose is: Today, Tomorrow Other:  _________ Dose:  325 mg Take 1 Tab by mouth two (2) times a day. Quantity:  60 Tab Refills:  0 oxyCODONE IR 5 mg immediate release tablet Commonly known as:  Dorrosendo Flores Your next dose is: Today, Tomorrow Other:  _________ Dose:  5-10 mg Take 1-2 Tabs by mouth every four (4) hours as needed for Pain. Max Daily Amount: 60 mg.  
 Quantity:  80 Tab Refills:  0 CONTINUE these medications which have NOT CHANGED Dose & Instructions Dispensing Information Comments Morning Noon Evening Bedtime Cetirizine 10 mg Cap Your next dose is: Today, Tomorrow Other:  _________ Take  by mouth. Refills:  0  
     
   
   
   
  
 cholecalciferol 1,000 unit Cap Commonly known as:  VITAMIN D3 Your next dose is: Today, Tomorrow Other:  _________ Take  by mouth daily. Refills:  0  
     
   
   
   
  
 famotidine 20 mg tablet Commonly known as:  PEPCID Your next dose is: Today, Tomorrow Other:  _________ Dose:  20 mg Take 20 mg by mouth as needed. Refills:  0 MULTIVITAMIN PO Your next dose is: Today, Tomorrow Other:  _________ Take  by mouth daily. Refills:  0 PROBIOTIC 4X 10-15 mg Tbec Generic drug:  B.infantis-B.ani-B.long-B.bifi Your next dose is: Today, Tomorrow Other:  _________ Take  by mouth daily. Refills:  0  
     
   
   
   
  
 zinc 15 mg Tab Your next dose is: Today, Tomorrow Other:  _________ Take  by mouth daily. Refills:  0 STOP taking these medications GLUCOSAMINE COMPLEX-MSM PO  
   
  
 ibuprofen 200 mg tablet Commonly known as:  MOTRIN  
   
  
 omega-3 acid ethyl esters 1 gram capsule Commonly known as:  Samara Postal Where to Get Your Medications Information on where to get these meds will be given to you by the nurse or doctor. ! Ask your nurse or doctor about these medications  
  aspirin delayed-release 325 mg tablet  
 oxyCODONE IR 5 mg immediate release tablet Discharge Instructions After Hospital Care Plan:  Discharge Instructions Anterior Approach Hip Replacement-Dr. Sergio Meraz Patient Ra Chisholm Date of procedure:1/24/2017 Procedure:Procedure(s): RIGHT TOTAL HIP ARTHROPLASTY ANTERIOR APPROACH Surgeon:Surgeon(s) and Role: Ruby Pizarro MD - Primary PCP: Scar Larkin MD 
Date of discharge: No discharge date for patient encounter. Follow up appointments ? Follow up with Dr. Sergio Meraz in 3 weeks. Call [phone] to make an appointment. ? If home health has been arranged for you the agency will contact you to arrange dates/times for visits. Please call them if you do not hear from them within 24 hours after you are discharged When to call your Orthopaedic Surgeon: Call [phone]. If you need to reach us after 5pm or on a weekend; call [phone] and the on call physician will be contacted ? Increased hip pain, unrelieved pain or if you have difficulty or are unable to walk ? Signs of infection-if your incision is red; continues to have drainage; drainage has a foul odor or if you have a persistent fever over 101 degrees ? Signs of a blood clot in your leg-calf pain, tenderness, redness, swelling of lower leg When to call your Primary Care Physician: 
? Concerns about medical conditions such as diabetes, high blood pressure, asthma, congestive heart failure ? Call if blood sugars are elevated, persistent headache or dizziness, coughing or congestion, constipation or diarrhea, burning with urination, abnormal heart rate 
c When to call [id number] go to the nearest emergency room ? Acute onset of chest pain, shortness of breath, difficulty breathing Activity ? Weight bearing as tolerated with walker or crutches.  Refer to pages 23-33 of your handbook for instructions and pictures ? Complete your Home Exercise Program daily as instructed by your therapist.  Refer to pages 36-42 of your handbook for instructions and pictures ? Get up every one hour and walk (except at night when sleeping) ? AVOID sudden and extreme movement of your hip (surgical leg) ? Do not drive or operate heavy machinery Incision Care ? Your incision has been closed with absorbable sutures and the Dermabond Prineo skin closure system which is a combination of a transparent mesh and a liquid adhesive that will assist with healing. ? The Dermabond Prineo mesh is to remain over your incision for 3 weeks. ? A dry dressing (ABD and paper tape) will be placed over it and should be changed daily. Please make sure to wash your hands prior to touching your dressing. ? You may shower daily with the Dermabond Prineo mesh in place. After your shower blot your incision dry with a soft towel and place a new dry dressing (ABD and paper tape) over your incision. Do NOT allow the tape to come in contact with the Prineo mesh. ? If you experience drainage leaking from under the Prineo mesh or if it peels off before 3 weeks, please contact my office. ? The Prineo mesh will be removed during your first office follow up visit. You may then shower and let your incision get wet but do not submerge your incision under water in a bath tub, hot tub or swimming pool for 6 weeks after surgery. Preventing blood clots ? Take one coated Aspirin twice a day for one month following surgery ? Wear elastic stockings (TEDS) for 4 weeks. You may remove them for approximately 1 hour daily for showering/sponge bathing Pain management ? Take pain medication as prescribed; decrease the amount you use as your pain lessens ? Avoid alcoholic beverages while taking pain medication ? Do not take any over-the-counter medication except for Tylenol (acetaminophen) ? Please be aware that many medications contain Tylenol. We do not want you to over medicate so please read the information below as a guide. Do not take more than 4 Grams of Tylenol in a 24 hour period. (There are 1000 milligrams in one Gram) o Percocet contains 325 mg of Tylenol per tablet (do not take more than 12 tablets in 24 hours) 
o Lortab contains 500 mg of Tylenol per tablet (do not take more than 8 tablets in 24 hours) o Norco contains 325 mg of Tylenol per tablet (do not take more than 12 tablets in 24 hours). ? You may place an ice bag on your hip for 15-20 minutes after exercising and as needed throughout the day and night Diet Resume usual diet; drink plenty of fluids; eat foods high in fiber You may want to take a stool softener (such as Senokot-S or Colace) to prevent constipation while you are taking pain medication. If constipation occurs, take a laxative (such as Dulcolax tablets, Milk of Magnesia, or a suppository) Home Health Care Protocol (to be followed by [address]) Nursing-per physicians order Complete head to toe assessment, vital signs Medication reconciliation Review pain management Manage chronic medical conditions Physical Therapy-per physicians order Weight bearing status: 
Precautions at Admission: Fall, WBAT (No extreme motions) Right Side Weight Bearing: As tolerated Mobility Status: 
Supine to Sit: Supervision Sit to Stand: Stand-by asssistance Sit to Supine: Supervision Gait: 
Distance (ft): 50 Feet (ft) Ambulation - Level of Assistance: Stand-by asssistance Assistive Device: Walker, rolling, Gait belt (pt minimally using RW for support) ADL status overall composite: 
  
  
  
  
  
 
Physical Therapy ? Assessment and evaluation-bed mobility; functional transfers (bed, chair, bathroom, stairs); ambulation with equipment, car transfers, safety and ability to get out of house in the event of an emergency ? AVOID sudden and extreme movement of the hip (surgical leg) ? Discuss pain management ? Review how to do ADLs. Refer to pages 43-47 of handbook Home Exercise Program-refer to pages 36-42 of handbook Discharge Orders None Introducing [facility] SERVICES! [facility] introduces [employer] patient portal. Now you can access parts of your medical record, email your doctor's office, and request medication refills online. 1. In your internet browser, go to [email or web address] 2. Click on the First Time User? Click Here link in the Sign In box. You will see the New Member Sign Up page. 3. Enter your [employer] Access Code exactly as it appears below. You will not need to use this code after youve completed the sign-up process. If you do not sign up before the expiration date, you must request a new code. · [employer] Access Code: [email or web address] Expires: 4/4/2017  3:13 PM 
 
4. Enter the last four digits of your Social Security Number (xxxx) and Date of Birth (mm/dd/yyyy) as indicated and click Submit. You will be taken to the next sign-up page. 5. Create a [employer] ID. This will be your [employer] login ID and cannot be changed, so think of one that is secure and easy to remember. 6. Create a [employer] password. You can change your password at any time. 7. Enter your Password Reset Question and Answer. This can be used at a later time if you forget your password. 8. Enter your e-mail address. You will receive e-mail notification when new information is available in [address] 9. Click Sign Up. You can now view and download portions of your medical record. 10. Click the Download Summary menu link to download a portable copy of your medical information. If you have questions, please visit the Frequently Asked Questions section of the [employer] website. Remember, [employer] is NOT to be used for urgent needs. For medical emergencies, dial 911. Now available from your iPhone and Android! General Information Please provide this summary of care documentation to your next provider. Patient Signature:  ____________________________________________________________ Date:  ____________________________________________________________  
  
Susanne  Provider Signature:  ____________________________________________________________ Date:  ____________________________________________________________

## 2017-01-24 NOTE — IP AVS SNAPSHOT
Current Discharge Medication List  
  
Take these medications at their scheduled times Dose & Instructions Dispensing Information Comments Morning Noon Evening Bedtime  
 aspirin delayed-release 325 mg tablet Your next dose is: Today, Tomorrow Other:  ____________ Dose:  325 mg Take 1 Tab by mouth two (2) times a day. Quantity:  60 Tab Refills:  0  
     
   
   
   
  
 cholecalciferol 1,000 unit Cap Commonly known as:  VITAMIN D3 Your next dose is: Today, Tomorrow Other:  ____________ Take  by mouth daily. Refills:  0 MULTIVITAMIN PO Your next dose is: Today, Tomorrow Other:  ____________ Take  by mouth daily. Refills:  0 PROBIOTIC 4X 10-15 mg Tbec Generic drug:  B.infantis-B.ani-B.long-B.bifi Your next dose is: Today, Tomorrow Other:  ____________ Take  by mouth daily. Refills:  0  
     
   
   
   
  
 zinc 15 mg Tab Your next dose is: Today, Tomorrow Other:  ____________ Take  by mouth daily. Refills:  0 Take these medications as needed Dose & Instructions Dispensing Information Comments Morning Noon Evening Bedtime  
 famotidine 20 mg tablet Commonly known as:  PEPCID Your next dose is: Today, Tomorrow Other:  ____________ Dose:  20 mg Take 20 mg by mouth as needed. Refills:  0  
     
   
   
   
  
 oxyCODONE IR 5 mg immediate release tablet Commonly known as:  Benjaman Hurl Your next dose is: Today, Tomorrow Other:  ____________ Dose:  5-10 mg Take 1-2 Tabs by mouth every four (4) hours as needed for Pain. Max Daily Amount: 60 mg.  
 Quantity:  80 Tab Refills:  0 Take these medications as directed Dose & Instructions Dispensing Information Comments Morning Noon Evening Bedtime Cetirizine 10 mg Cap Your next dose is: Today, Tomorrow Other:  ____________ Take  by mouth. Refills:  0 Where to Get Your Medications Information about where to get these medications is not yet available ! Ask your nurse or doctor about these medications  
  aspirin delayed-release 325 mg tablet  
 oxyCODONE IR 5 mg immediate release tablet

## 2017-01-24 NOTE — OP NOTES
OPERATIVE REPORT  RIGHT TOTAL HIP REPLACEMENT (ANTERIOR APPROACH)    NAME: Chayito Chaudhry  MRN: [id number]  :  1960  AGE: 55 y.o. DATE OF SURGERY:  2017    PREOPERATIVE DIAGNOSIS: Severe degenerative joint disease, right hip. POSTOPERATIVE DIAGNOSIS: Severe degenerative joint disease, right hip. PROCEDURES PERFORMED: Right total hip replacement - Anterior approach    SURGEON: Lindsay Saunders MD.    ASSISTANT: [employer]. Mehnaz Chávez PA-C    ANESTHESIA: General    ESTIMATED BLOOD LOSS: 350 mL. DRAINS: None. COMPLICATIONS: None. SPECIMENS REMOVED: None. PRE-OP ANTIBIOTIC: Ancef 2 gram    IMPLANT:   Implant Name Type Inv. Item Serial No.  Lot No. LRB No. Used Action   MEDACTA ACETABULAR SHELL SCREW PLUG    [id number]  [id number] Right 1 Implanted   MEDACTA ACETABULAR LINER 36 MM  ID   [id number]  [id number] Right 1 Implanted   MEDACTA ACETABULAR SHELL 54 MM OD   [id number]  [id number] Right 1 Implanted   MEDACTA CANCELLOUS BONE SCREW 6.5 MM X 40 MM     [id number] Right 1 Implanted   MEDACTA CANCELLOUS BONE SCREW 6.5 MM X 20 MM     [id number] Right 1 Implanted   MEDACTA FEMORAL STM SIZE 4 SN STD TAPER  [id number]  [id number] Right 1 Implanted   MEDACTA FEMORAL HEAD 36 MM SIZE XL OFFSET [+8]        [id number] Right 1 Implanted       INDICATIONS: 55 yrs male  with severe DJD of the right hip. The patient's right hip has been progressive in terms of symptoms. The patient now has severe activity limitation. The patient has continued with conservative management without adequate relief or improvement of functional limitations. We discussed options and he wished to proceed with right total hip replacement. The patient has continued with conservative management without adequate relief or improvement of functional limitations. DESCRIPTION OF PROCEDURE: Anesthetic was initiated. Preoperative dose of IV Ancef was given. Heck catheter was placed.  The right side was confirmed as the operative side, prepped and draped in the usual sterile fashion. Skin was covered with Ioban occlusive dressing. Direct anterior exposure was made to the patient's hip through the sartorius tensor interval. Anterior hip vasculature was cauterized. Retractors were taken out to observe for bleeding and there was none. The capsule was identified, opened and T'd distally. The femoral neck was osteotomized. Femoral head was removed from the acetabulum, which was exposed and soft tissues were removed. The acetabulum was progressively reamed to 54 and a 54 trial shell was impacted with good press-fit. This was removed and a 54 Medacta  shell was impacted in the acetabulum in 40 degrees of abduction in an anatomic-type anteversion. Bone spurs were removed and 6.5 screws x2 were placed. The polyethylene liner was placed. Femur was positioned and elevated from the wound. The medullary canal was entered. Flexible reamers were utilized as the patient had a narrowed femoral canal, broached to a size 4. Calcar planed and then trialed. A 36 mm, +8 hip ball was the most appropriate for leg length and tension with a standard offset stem. The hip was dislocated. The anterior greater trochanter was trimmed down to enhance flexion, rotation and stability. The trial was removed and the real stem was impacted. The real hip ball was placed. The hip was reduced. After copious irrigation, the capsule was closed with #2 Vicryl sutures. I irrigated the skin, subcutaneous and deep wound. I closed the fascia of the tensor fascia ramin with #2 Vicryl sutures. Skin and subcutaneous were irrigated. Soft tissues were infiltrated with local anesthetic. Skin and subcutaneous were closed in a standard fashion. Sterile dressing was applied. There were no complications. No specimen was sent. The procedure was a RIGHT TOTAL HIP REPLACEMENT using a Medacta total hip construct.  T[employer] PA-C was of vital assistance throughout the duration of the procedure. The patient was transferred to the recovery room in stable condition.

## 2017-01-24 NOTE — H&P
Ann Navarro. [place]  Location: [place] [address] [address]  Patient #: [id number]  : 1960   / Language: English / Race: White  Male      History of Present Illness   The patient is a 55year old male who presents to the practice today for a who presents to the practice today for a transition into care. Additional reasons for visit:    Hip Pain is described as the following: The onset of the hip pain has been gradual and has been occurring in a persistent pattern for 1 year. The course has been worsening. The hip pain is described as a moderate dull aching. The hip pain is described as being located in the groin, hip (right) and anterior thigh. Note for \"Hip Pain\": Patient presents for evaluation of his right hip. He's had slow progressive onset of symptoms. Started off as what he thought was a groin pull. He's taken extensive anti-inflammatory medications. He did see his primary care DrJose who x-rayed him that these are year and a half old. They felt he had mild arthritis. He is here today with severe hip pain. Inability to sleep. Problems getting his shoes and socks on. All symptoms are consistent with severe DJD. Allergies   No Known Drug Allergies 2016  No Known Allergies 2016    Family History   Alcohol Abuse  Father. Anemia  Mother. Arthritis  Mother. Depression  Father, Mother. Heart Disease  Mother. Migraine Headache  Sister. Respiratory Condition  Father. Social History  Alcohol use  1 time, 1-2 drinks per occasion, Drinks beer, per week, Rarely drinks more than 5 drinks per occasion. Caffeine use  1-2 drinks per day, Coffee. Current work status  Full-time. Exercise  Inactive. Marital status  . No drug use   Seat Belt Use  Always uses seat belts. Vannesa Alexandro Frequently. Tobacco / smoke exposure  None. Tobacco use  Never smoker. Medication History   ZyrTEC Allergy  (Oral) Specific dose unknown - Active.   Multivitamins  (Oral) Specific dose unknown - Active. Fish Oil  (Oral) Specific dose unknown - Active. Glucosamine 1500 Complex  (Oral) Active. Medications Reconciled     Past Surgical History  Colon Polyp Removal - Colonoscopy   Vasectomy     Other Problems  Gastroesophageal Reflux Disease         Review of Systems  General Not Present- Appetite Loss, Chills, Fatigue, Fever, HIV Exposure, Night Sweats, Persistent Infections, Seasonal Allergies, Weight Gain and Weight Loss. Skin Not Present- Itching, Nail Changes, Poor Wound Healing, Rash, Skin Color Changes, Suspicious Lesions and Yellowish Skin Color. HEENT Present- Ringing in the Ears. Not Present- Decreased Hearing, Double Vision, Earache, Hoarseness, Jaundice/Yellow Eyes, Loose Teeth, Nose Bleed and Sore Throat. Respiratory Not Present- Bloody sputum, Chronic Cough, Difficulty Breathing, Snoring, Wakes up from Sleep Wheezing or Short of Breath and Wheezing. Cardiovascular Present- Leg Cramps With Exertion. Not Present- Bluish Discoloration Of Lips Or Nails, Chest Pain, Difficulty Breathing Lying Down, Difficulty Breathing On Exertion, Palpitations and Swelling of Extremities. Gastrointestinal Not Present- Abdominal Pain, Black, Tarry Stool, Change in Bowel Habits, Cirrhosis, Constipation, Diarrhea, Difficulty Swallowing, Nausea and Vomiting. Male Genitourinary Present- Pelvic Pain. Not Present- Blood in Urine, Frequency, Painful Urination, Trouble Starting Urinary Stream and Urgency. Musculoskeletal Present- Joint Pain and Joint Stiffness. Not Present- Back Pain and Joint Swelling. Neurological Not Present- Fainting, Headaches, Memory Loss, Numbness, Seizures, Tingling, Tremor, Unsteadiness and Weakness. Psychiatric Not Present- Anxiety, Bipolar and Depression. Endocrine Not Present- Cold Intolerance, Excessive Hunger, Excessive Thirst, Excessive Urination and Heat Intolerance.   Hematology Not Present- Abnormal Bruising , Enlarged Lymph Nodes, Excessive bleeding and Skin Discoloration. Vitals   12/1/2016 9:19 AM  Weight: 240 lb   Height: 70 in   Weight was reported by patient. Height was reported by patient. Body Surface Area: 2.26 m²   Body Mass Index: 34.44 kg/m²                Physical Exam   Musculoskeletal  Global Assessment  Examination of related systems reveals - well-developed, well-nourished, in no acute distress, alert and oriented x 3, no rashes or ulcers of bilateral upper and lower extremities, head or trunk, no generalized swelling or edema of extremities, no digital clubbing or cyanosis, neurovascularly intact globally with normal deep tendon reflexes and normal coordination. Gait and Station: Note: Patient's gait is altered with significant external rotation of his hip when he ambulates. Right Lower Extremity - Note: Patient's hip was examined. Patient's right hip was examined and shows significant loss range of motion with positive log roll test positive impingement test. Patient has basically lost rotation and 90° of flexion. Hip flexes to about 90°. Slight flexion contracture. Straight leg raise and femoral nerve stretch test are negative. Extremity is sensate and perfused with palpable dorsalis pedis pulse. Hip flexors, quads, ankle plantar flexors, ankle dorsiflexors have 5 out of 5 strength. Left Lower Extremity - Note: Patient's hip was examined. Impingement and apprehension signs are negative. Hip extends fully. Hip flexes to 100+ degrees. There is no trochanteric tenderness. Straight leg raise and femoral nerve stretch test are negative. Extremity is sensate and perfused with palpable dorsalis pedis pulse. Hip flexors, quads, ankle plantar flexors, ankle dorsiflexors have 5 out of 5 strength. Assessment & Plan   REVIEW OF SYSTEMS: Systems were reviewed by the provider.(V49.9)  Current Plans  Pt Education - How to access health information online: discussed with patient and provided information.   Pt Education - Educational materials were provided.: discussed with patient and provided information. X-RAY EXAM OF HIP COMPLETE min 2 VIEWS (38359) (right 3 views of the patient's right hip show severe end-stage DJD of the right hip secondary to hip dysplasia. He is wearing the lateral corner of his acetabulum. Large cysts are present in the femoral head.)  Primary localized osteoarthritis of right hip (715.15  M16.11)  Impression: Severe right hip DJD. Discussed options. Patient is indicated for hip replacement surgery. His hip is beginning to wear some of his acetabular bone site. All questions were answered. He was scheduled for a right total hip replacement.

## 2017-01-24 NOTE — ROUTINE PROCESS
Patient: Stacey Henley MRN: [id number]  SSN: [id number]   YOB: 1960  Age: 55 y.o. Sex: male     Patient is status post Procedure(s):  RIGHT TOTAL HIP ARTHROPLASTY ANTERIOR APPROACH.     Surgeon(s) and Role:     * Liam Berger MD - Primary    Local/Dose/Irrigation:Right hip injection: 0.5% Marcaine w/ epinephrine 1:200,000 320 ml, Exparel 266 mg, Toradol 30 mg, Morphine 10 mg in 70 ml injectable saline                Peripheral IV 01/24/17 Left Hand (Active)            Airway - Endotracheal Tube 01/24/17 Oral (Active)                   Dressing/Packing:  Wound Hip Right-DRESSING TYPE: ABD pad;Non-adherent (Dermabond prineo, Hypafix tape) (01/24/17 0900)  Splint/Cast:  ]    Other: TXA 2 gm topically to right hip

## 2017-01-24 NOTE — PROGRESS NOTES
Bedside and Verbal shift change report given to Sabino Reyes RN (oncoming nurse) by Javi Abdul RN (offgoing nurse). Report included the following information SBAR, Kardex and MAR.

## 2017-01-25 VITALS
DIASTOLIC BLOOD PRESSURE: 55 MMHG | BODY MASS INDEX: 35.36 KG/M2 | HEIGHT: 70 IN | SYSTOLIC BLOOD PRESSURE: 105 MMHG | WEIGHT: 247 LBS | HEART RATE: 77 BPM | TEMPERATURE: 98.2 F | RESPIRATION RATE: 16 BRPM | OXYGEN SATURATION: 95 %

## 2017-01-25 LAB
ANION GAP BLD CALC-SCNC: 9 MMOL/L (ref 5–15)
BUN SERPL-MCNC: 14 MG/DL (ref 6–20)
BUN/CREAT SERPL: 18 (ref 12–20)
CALCIUM SERPL-MCNC: 8.2 MG/DL (ref 8.5–10.1)
CHLORIDE SERPL-SCNC: 110 MMOL/L (ref 97–108)
CO2 SERPL-SCNC: 22 MMOL/L (ref 21–32)
CREAT SERPL-MCNC: 0.8 MG/DL (ref 0.7–1.3)
GLUCOSE SERPL-MCNC: 116 MG/DL (ref 65–100)
HGB BLD-MCNC: 12.2 G/DL (ref 12.1–17)
POTASSIUM SERPL-SCNC: 4.2 MMOL/L (ref 3.5–5.1)
SODIUM SERPL-SCNC: 141 MMOL/L (ref 136–145)

## 2017-01-25 PROCEDURE — 77030011256 HC DRSG MEPILEX <16IN NO BORD MOLN -A

## 2017-01-25 PROCEDURE — 80048 BASIC METABOLIC PNL TOTAL CA: CPT | Performed by: ORTHOPAEDIC SURGERY

## 2017-01-25 PROCEDURE — G8987 SELF CARE CURRENT STATUS: HCPCS

## 2017-01-25 PROCEDURE — G8988 SELF CARE GOAL STATUS: HCPCS

## 2017-01-25 PROCEDURE — 36415 COLL VENOUS BLD VENIPUNCTURE: CPT | Performed by: ORTHOPAEDIC SURGERY

## 2017-01-25 PROCEDURE — 97535 SELF CARE MNGMENT TRAINING: CPT

## 2017-01-25 PROCEDURE — 97165 OT EVAL LOW COMPLEX 30 MIN: CPT

## 2017-01-25 PROCEDURE — 74011250636 HC RX REV CODE- 250/636: Performed by: PHYSICIAN ASSISTANT

## 2017-01-25 PROCEDURE — G8989 SELF CARE D/C STATUS: HCPCS

## 2017-01-25 PROCEDURE — 85018 HEMOGLOBIN: CPT | Performed by: ORTHOPAEDIC SURGERY

## 2017-01-25 PROCEDURE — 74011250637 HC RX REV CODE- 250/637: Performed by: PHYSICIAN ASSISTANT

## 2017-01-25 PROCEDURE — 97116 GAIT TRAINING THERAPY: CPT

## 2017-01-25 RX ORDER — ASPIRIN 325 MG
325 TABLET, DELAYED RELEASE (ENTERIC COATED) ORAL 2 TIMES DAILY
Qty: 60 TAB | Refills: 0 | Status: SHIPPED | OUTPATIENT
Start: 2017-01-25 | End: 2018-11-20

## 2017-01-25 RX ORDER — OXYCODONE HYDROCHLORIDE 5 MG/1
5-10 TABLET ORAL
Qty: 80 TAB | Refills: 0 | Status: SHIPPED | OUTPATIENT
Start: 2017-01-25 | End: 2018-11-20

## 2017-01-25 RX ADMIN — CETIRIZINE HYDROCHLORIDE 10 MG: 10 TABLET, FILM COATED ORAL at 09:29

## 2017-01-25 RX ADMIN — ASPIRIN 325 MG: 325 TABLET, DELAYED RELEASE ORAL at 09:29

## 2017-01-25 RX ADMIN — DEXAMETHASONE SODIUM PHOSPHATE 10 MG: 4 INJECTION, SOLUTION INTRAMUSCULAR; INTRAVENOUS at 07:03

## 2017-01-25 RX ADMIN — SODIUM CHLORIDE 125 ML/HR: 900 INJECTION, SOLUTION INTRAVENOUS at 05:35

## 2017-01-25 RX ADMIN — ACETAMINOPHEN 650 MG: 325 TABLET, FILM COATED ORAL at 05:35

## 2017-01-25 RX ADMIN — POLYETHYLENE GLYCOL 3350 17 G: 17 POWDER, FOR SOLUTION ORAL at 09:29

## 2017-01-25 RX ADMIN — KETOROLAC TROMETHAMINE 30 MG: 30 INJECTION, SOLUTION INTRAMUSCULAR at 05:35

## 2017-01-25 RX ADMIN — OXYCODONE HYDROCHLORIDE 5 MG: 5 TABLET ORAL at 09:29

## 2017-01-25 RX ADMIN — DOCUSATE SODIUM AND SENNOSIDES 1 TABLET: 8.6; 5 TABLET, FILM COATED ORAL at 09:29

## 2017-01-25 NOTE — PROGRESS NOTES
Bedside and Verbal shift change report given to Courtney Prince RN  (oncoming nurse) by Barbara Richter RN  (offgoing nurse). Report included the following information SBAR.

## 2017-01-25 NOTE — PROGRESS NOTES
I have reviewed discharge instructions with the patient. The patient verbalized understanding.  Patient leaving to go home with wife by car; patient leaving with copy of discharge instructions, 2 prescriptions (declined bedside RX), extra dressing, AYESHA and ice packs supplies, personal belongings, patient signed electronically; volunteers called to assist patient to discharge area

## 2017-01-25 NOTE — PROGRESS NOTES
Problem: Mobility Impaired (Adult and Pediatric)  Goal: *Acute Goals and Plan of Care (Insert Text)  Physical Therapy Goals  Initiated 1/24/2017    1. Patient will move from supine to sit and sit to supine in bed with modified independence within 4 days. 2. Patient will perform sit to stand with modified independence within 4 days. 3. Patient will ambulate with modified independence for 250 feet with the least restrictive device within 4 days. 4. Patient will ascend/descend 2 stairs without use of handrail(s) with modified independence within 4 days. 5. Patient will perform hip home exercise program per protocol with independence within 4 days. PHYSICAL THERAPY TREATMENT/DISCHARGE  Patient: Shankar Page (46 y.o. male)  Date: 1/25/2017  Diagnosis: OSTEO ARTHRITIS RIGHT HIP  Degenerative joint disease (DJD) of hip Degenerative joint disease (DJD) of hip  Procedure(s) (LRB):  RIGHT TOTAL HIP ARTHROPLASTY ANTERIOR APPROACH (Right) 1 Day Post-Op  Precautions: Fall, WBAT (no extreme movements)      ASSESSMENT:  Pt received ambulating in hallway with his wife. Pt utilizing axillary crutches (3 point gait). Noted excessive toeing out on the right foot; initially corrected with cuing, but pt unable to maintain with fatigue. Pt ambulated a total distance of 300 ft with axillary crutches, step through pattern, minimally antalgic. Reviewed role of HHPT, hip precautions, LE exercises (including standing exercises) and frequency of performance, signs/symptoms of DVTs, icing schedule, and LE positioning in bed and in chair; Pt and pt's wife verbalized and demonstrated understanding. Initiated stair training - pt ascended/descended x 8 steps with bilateral axillary crutches; good carryover noted from initial demonstration/verbal instruction. Emphasized activity pacing for safety.  Pt reporting difficulty getting right LE into bed; had pt trial linking left LE underneath his right LE vs. Gait belt as a leg  (pt preferred using the gait belt). Pt has no further skilled acute PT needs and would highly benefit from transition to [address] in order to maximize right LE ROM/strength/balance, progress gait with a less restrictive assistive device, and to ensure safety with functional mobility and household tasks. PLAN:  Patient will be discharged from physical therapy at this time. Rationale for discharge:  [X]    Goals Achieved  [ ]    Radu Ran  [ ]    Patient not participating in therapy  [ ]    Other:  Discharge Recommendations:  Home Health  Further Equipment Recommendations for Discharge:  Pt has axillary crutches       SUBJECTIVE:   Patient stated Elizabeth Mark you for being so thorough.       OBJECTIVE DATA SUMMARY:   Critical Behavior:  Neurologic State: Alert  Orientation Level: Oriented X4  Cognition: Appropriate for age attention/concentration  Safety/Judgement: Good awareness of safety precautions      Functional Mobility Training:  Bed Mobility:  Supine to Sit: Independent  Sit to Supine: Independent  Transfers:  Sit to Stand: Modified independent  Stand to Sit: Modified independent  Bed to Chair: Supervision  Balance:  Sitting: Intact  Standing: Intact; With support  Ambulation/Gait Training:  Distance (ft): 300 Feet (ft)  Assistive Device: Gait belt;Crutches  Ambulation - Level of Assistance: Supervision;Modified independent  Gait Abnormalities: Antalgic  Right Side Weight Bearing: As tolerated  Base of Support: Widened  Stance: Right decreased  Step Length: Right shortened;Left shortened     Stairs:  Number of Stairs Trained: 8  Stairs - Level of Assistance: Supervision  Rail Use: None     Therapeutic Exercises:       STANDING  EXERCISES   Sets   Reps   Active Active Assist   Passive Self ROM   Comments   Heel Raises   10 [X]                                          [ ]                                          [ ]                                          [ ]                                              Hip Abduction   10 [X]                                          [ ]                                          [ ]                                          [ ]                                              Hamstring Curl   10 [X]                                          [ ]                                          [ ]                                          [ ]                                              Mini Squats   10 [X]                                          [ ]                                          [ ]                                          [ ]                                                 Pain:  Pain Scale 1: Numeric (0 - 10)  Pain Intensity 1: 0  Pain Location 1: Hip  Pain Orientation 1: Right  Pain Description 1: Aching  Pain Intervention(s) 1: Medication (see MAR); Ice      Activity Tolerance:   Please refer to the flowsheet for vital signs taken during this treatment.   After treatment:   [X]  Patient left in no apparent distress sitting up in chair  [ ]  Patient left in no apparent distress in bed  [X]  Call bell left within reach  [X]  Nursing notified   [ ]  Caregiver present  [ ]  Bed alarm activated      COMMUNICATION/COLLABORATION:   The patients plan of care was discussed with: Occupational Therapist and Registered Nurse     Mark Jang PT, DPT   Time Calculation: 16 mins

## 2017-01-25 NOTE — PROGRESS NOTES
Bedside shift change report given to Yoandy green RN (oncoming nurse) by YUDELKA Chirinos RN (offgoing nurse). Report included the following information SBAR, Kardex and Recent Results.

## 2017-01-25 NOTE — PROGRESS NOTES
Bedside shift change report given to Sherly Arellano RN (oncoming nurse) by DAVID Albarado RN (offgoing nurse). Report included the following information SBAR.

## 2017-01-25 NOTE — ANESTHESIA POSTPROCEDURE EVALUATION
Post-Anesthesia Evaluation and Assessment    Patient: Helen Bailey MRN: [id number]  SSN: [id number]    YOB: 1960  Age: 55 y.o. Sex: male       Cardiovascular Function/Vital Signs  Visit Vitals    /64 (BP 1 Location: Left arm, BP Patient Position: At rest)    Pulse 91    Temp 36.8 °C (98.3 °F)    Resp 16    Ht 5' 10\" (1.778 m)    Wt 112 kg (247 lb)    SpO2 96%    BMI 35.44 kg/m2       Patient is status post general anesthesia for Procedure(s):  RIGHT TOTAL HIP ARTHROPLASTY ANTERIOR APPROACH. Nausea/Vomiting: None    Postoperative hydration reviewed and adequate. Pain:  Pain Scale 1: Numeric (0 - 10) (01/24/17 2003)  Pain Intensity 1: 0 (01/24/17 2003)   Managed    Neurological Status:   Neuro (WDL): Exceptions to WDL (01/24/17 1125)  Neuro  Neurologic State: Alert (01/24/17 1600)  Orientation Level: Oriented X4 (01/24/17 1600)  Cognition: Appropriate decision making; Appropriate for age attention/concentration; Appropriate safety awareness (01/24/17 1600)  Speech: Clear (01/24/17 1310)  LUE Motor Response: Purposeful (01/24/17 1600)  LLE Motor Response: Purposeful (01/24/17 1600)  RUE Motor Response: Purposeful (01/24/17 1600)  RLE Motor Response: Purposeful (01/24/17 1600)   At baseline    Mental Status and Level of Consciousness: Arousable    Pulmonary Status:   O2 Device: Nasal cannula (01/24/17 2003)   Adequate oxygenation and airway patent    Complications related to anesthesia: None    Post-anesthesia assessment completed.  No concerns    Signed By: Km Qureshi MD     January 24, 2017

## 2017-01-25 NOTE — PROGRESS NOTES
Complaints: none   Events: none      GEN:  NAD. AOx3   ABD:  S/NT/ND   RLE:  Dressing C/D/I    5/5 motor    Calf nttp (Bilat)    Sensate all distribution to light touch    1+ dp/pt pulses, foot perfused      Lab Results   Component Value Date/Time    HGB 12.2 01/25/2017 03:12 AM    INR 1.0 01/05/2017 08:45 AM       Lab Results   Component Value Date/Time    Sodium 141 01/25/2017 03:12 AM    Potassium 4.2 01/25/2017 03:12 AM    Chloride 110 01/25/2017 03:12 AM    CO2 22 01/25/2017 03:12 AM    BUN 14 01/25/2017 03:12 AM    Creatinine 0.80 01/25/2017 03:12 AM    Calcium 8.2 01/25/2017 03:12 AM            POD #1 RIGHT TOTAL HIP REPLACEMENT. Satisfactory progress. ABX: Complete today  PATHWAY: D/C Maria R per protocol  DVT Prophylaxis: Aspirin, SCD, AYESHA   Weight Bearing: WBAT RLE   Pain Control: PRN oral narcotics  Anticipated Discharge Date: 1-25-17   Disposition: Home, HHPT.

## 2017-01-25 NOTE — DISCHARGE INSTRUCTIONS
After Hospital Care Plan:  Discharge Instructions Anterior Approach   Hip Replacement-Dr. Sergio Meraz    Patient Name:Tolu Blandon  Date of procedure:1/24/2017    Procedure:Procedure(s):  RIGHT TOTAL HIP ARTHROPLASTY ANTERIOR APPROACH  Surgeon:Surgeon(s) and Role:     * Kelli Cantu MD - Primary   PCP: Scar Larkin MD  Date of discharge: No discharge date for patient encounter. Follow up appointments   Follow up with Dr. Sergio Meraz in 3 weeks. Call [phone] to make an appointment.  If home health has been arranged for you the agency will contact you to arrange dates/times for visits. Please call them if you do not hear from them within 24 hours after you are discharged    When to call your Orthopaedic Surgeon: Call [phone]. If you need to reach us after 5pm or on a weekend; call [phone] and the on call physician will be contacted   Increased hip pain, unrelieved pain or if you have difficulty or are unable to walk   Signs of infection-if your incision is red; continues to have drainage; drainage has a foul odor or if you have a persistent fever over 101 degrees   Signs of a blood clot in your leg-calf pain, tenderness, redness, swelling of lower leg    When to call your Primary Care Physician:   Concerns about medical conditions such as diabetes, high blood pressure, asthma, congestive heart failure   Call if blood sugars are elevated, persistent headache or dizziness, coughing or congestion, constipation or diarrhea, burning with urination, abnormal heart rate  c  When to call [id number] go to the nearest emergency room   Acute onset of chest pain, shortness of breath, difficulty breathing    Activity   Weight bearing as tolerated with walker or crutches.  Refer to pages 23-33 of your handbook for instructions and pictures   Complete your Home Exercise Program daily as instructed by your therapist.  Refer to pages 36-42 of your handbook for instructions and pictures   Get up every one hour and walk (except at night when sleeping)   AVOID sudden and extreme movement of your hip (surgical leg)   Do not drive or operate heavy machinery    Incision Care     Your incision has been closed with absorbable sutures and the Dermabond Prineo skin closure system which is a combination of a transparent mesh and a liquid adhesive that will assist with healing.  The Dermabond Prineo mesh is to remain over your incision for 3 weeks.  A dry dressing (ABD and paper tape) will be placed over it and should be changed daily. Please make sure to wash your hands prior to touching your dressing.  You may shower daily with the Dermabond Prineo mesh in place. After your shower blot your incision dry with a soft towel and place a new dry dressing (ABD and paper tape) over your incision. Do NOT allow the tape to come in contact with the Prineo mesh.  If you experience drainage leaking from under the Prineo mesh or if it peels off before 3 weeks, please contact my office.  The Prineo mesh will be removed during your first office follow up visit. You may then shower and let your incision get wet but do not submerge your incision under water in a bath tub, hot tub or swimming pool for 6 weeks after surgery. Preventing blood clots   Take one coated Aspirin twice a day for one month following surgery   Wear elastic stockings (TEDS) for 4 weeks. You may remove them for approximately 1 hour daily for showering/sponge bathing    Pain management   Take pain medication as prescribed; decrease the amount you use as your pain lessens   Avoid alcoholic beverages while taking pain medication   Do not take any over-the-counter medication except for Tylenol (acetaminophen)   Please be aware that many medications contain Tylenol. We do not want you to over medicate so please read the information below as a guide. Do not take more than 4 Grams of Tylenol in a 24 hour period.   (There are 1000 milligrams in one Gram)  o Percocet contains 325 mg of Tylenol per tablet (do not take more than 12 tablets in 24 hours)  o Lortab contains 500 mg of Tylenol per tablet (do not take more than 8 tablets in 24 hours)  o Norco contains 325 mg of Tylenol per tablet (do not take more than 12 tablets in 24 hours).  You may place an ice bag on your hip for 15-20 minutes after exercising and as needed throughout the day and night    Diet  Resume usual diet; drink plenty of fluids; eat foods high in fiber  You may want to take a stool softener (such as Senokot-S or Colace) to prevent constipation while you are taking pain medication. If constipation occurs, take a laxative (such as Dulcolax tablets, Milk of Magnesia, or a suppository)    [facility] Protocol (to be followed by [address])    Nursing-per physicians order  Complete head to toe assessment, vital signs  Medication reconciliation  Review pain management  Manage chronic medical conditions    Physical Therapy-per physicians order    Weight bearing status:  Precautions at Admission: Fall, WBAT (No extreme motions)     Right Side Weight Bearing: As tolerated    Mobility Status:  Supine to Sit: Supervision  Sit to Stand: Stand-by asssistance  Sit to Supine: Supervision       Gait:  Distance (ft): 50 Feet (ft)  Ambulation - Level of Assistance: Stand-by asssistance  Assistive Device: Walker, rolling, Gait belt (pt minimally using RW for support)       ADL status overall composite:                   Physical Therapy   Assessment and evaluation-bed mobility; functional transfers (bed, chair, bathroom, stairs); ambulation with equipment, car transfers, safety and ability to get out of house in the event of an emergency   AVOID sudden and extreme movement of the hip (surgical leg)   Discuss pain management   Review how to do ADLs.   Refer to pages 43-47 of handbook    Home Exercise Program-refer to pages 36-42 of handbook

## 2017-01-25 NOTE — PROGRESS NOTES
Occupational Therapy EVALUATION/discharge  Patient: Stacey Henley (8 y.o. male)  Date: 1/25/2017  Primary Diagnosis: OSTEO ARTHRITIS RIGHT HIP  Degenerative joint disease (DJD) of hip  Procedure(s) (LRB):  RIGHT TOTAL HIP ARTHROPLASTY ANTERIOR APPROACH (Right) 1 Day Post-Op   Precautions:   Fall, WBAT (no extreme movements)    ASSESSMENT:   Based on the objective data described below, the patient presents with great performance with self care and functional mobility following right THR. Pt was able to complete dressing following training and education for use of AE to complete lower body dressing tasks. Pt did well with all tasks this morning. Pt does have hip kit for home. Pt has no further needs for skilled OT intervention. Recommend discharge OT services. Further skilled acute occupational therapy is not indicated at this time. Discharge Recommendations: None  Further Equipment Recommendations for Discharge: none      SUBJECTIVE:   Patient stated I am feeling alright.     OBJECTIVE DATA SUMMARY:   HISTORY:   Past Medical History   Diagnosis Date    Chronic pain     Coagulation disorder (HCC)      HX ANEMIA    GERD (gastroesophageal reflux disease)      OCCASIONAL    Sleep apnea      CPAP     Past Surgical History   Procedure Laterality Date    Hx heent       WISDOM TEETH    Hx colonoscopy      Hx endoscopy         Prior Level of Function/Home Situation: pt was independent prior to surgery.    Expanded or extensive additional review of patient history:     Home Situation  Home Environment: Private residence  # Steps to Enter: 2  Rails to Enter: No  One/Two Story Residence: One story  Living Alone: No  Support Systems: Spouse/Significant Other/Partner  Patient Expects to be Discharged to[de-identified] Private residence  Current DME Used/Available at Home: [address], straight, Walker, rolling  Tub or Shower Type: Shower  [x]  Right hand dominant   []  Left hand dominant    EXAMINATION OF PERFORMANCE DEFICITS:  Cognitive/Behavioral Status:  Neurologic State: Alert  Orientation Level: Oriented X4  Cognition: Appropriate for age attention/concentration  Perception: Appears intact  Perseveration: No perseveration noted  Safety/Judgement: Good awareness of safety precautions  Skin: dressing intact  Edema: none noted  Vision/Perceptual:                           Acuity: Within Defined Limits       Range of Motion:    AROM: Within functional limits  PROM: Within functional limits                    Strength:    Strength: Within functional limits              Coordination:  Coordination: Within functional limits  Fine Motor Skills-Upper: Right Intact; Left Intact    Gross Motor Skills-Upper: Right Intact; Left Intact  Tone & Sensation:    Tone: Normal  Sensation: Intact                      Balance:  Sitting: Intact  Standing: Intact; With support    Functional Mobility and Transfers for ADLs:  Bed Mobility:  Supine to Sit: Additional time;Supervision  Sit to Supine:  (remained in chair)    Transfers:  Sit to Stand: Supervision  Stand to Sit: Supervision  Bed to Chair: Supervision  Toilet Transfer : Supervision    ADL Assessment:  Feeding: Supervision    Oral Facial Hygiene/Grooming: Supervision    Bathing: Minimum assistance    Upper Body Dressing: Supervision    Lower Body Dressing: Supervision    Toileting: Supervision                ADL Intervention and task modifications:     IADL training:   Discussed at length precautions with IADL tasks. Discussed body alignment and ensuring pt does not twist hips/knees to ensure proper body alignment. Discussed finger tip rule for daily activities and to use a reacher for all tasks that are out of reach. Pt discussed to avoid tasks such as sweeping, mopping, vacuuming, changing bed linens, carrying a laundry basket, reaching into a low oven, or cleaning showers and toilets. Pt verbalized understanding of instructions.   Did encourage pt to stand at sink for grooming, washing dishes, and light meal preparations to increase overall standing tolerance and independence with all activities. Shower transfers:   Discussed technique for walk in shower transfers. Pt educated to step in with strong leg and to come out with operated leg to ensure safety with task. Pt instructed to have a family member or friend with them when they first attempt to get in the shower. Pt educated they can use the walker as needed to step into the shower for stability. Hip kit training completed sitting in chair. Pt reviewed use of all parts and pieces in hip kit. Pt did well with tasks. Cognitive Retraining  Safety/Judgement: Good awareness of safety precautions    Functional Measure:  Barthel Index:    Bathin  Bladder: 10  Bowels: 10  Groomin  Dressin  Feeding: 10  Mobility: 10  Stairs: 5  Toilet Use: 10  Transfer (Bed to Chair and Back): 10  Total: 75       Barthel and G-code impairment scale:  Percentage of impairment CH  0% CI  1-19% CJ  20-39% CK  40-59% CL  60-79% CM  80-99% CN  100%   Barthel Score 0-100 100 99-80 79-60 59-40 20-39 1-19   0   Barthel Score 0-20 20 17-19 13-16 9-12 5-8 1-4 0      The Barthel ADL Index: Guidelines  1. The index should be used as a record of what a patient does, not as a record of what a patient could do. 2. The main aim is to establish degree of independence from any help, physical or verbal, however minor and for whatever reason. 3. The need for supervision renders the patient not independent. 4. A patient's performance should be established using the best available evidence. Asking the patient, friends/relatives and nurses are the usual sources, but direct observation and common sense are also important. However direct testing is not needed. 5. Usually the patient's performance over the preceding 24-48 hours is important, but occasionally longer periods will be relevant.   6. Middle categories imply that the patient supplies over 50 per cent of the effort. 7. Use of aids to be independent is allowed. Mickie Villela., Barthel, D.W. (6095). Functional evaluation: the Barthel Index. [facility] (14)2. ISAIAH Faye, Christel Peñaloza., [place] Ruddy., [place], [address] (1999). Measuring the change indisability after inpatient rehabilitation; comparison of the responsiveness of the Barthel Index and Functional [place] Measure. Journal of Neurology, Neurosurgery, and Psychiatry, 66(4), 036-584. Dakota Jon, NOSMANI.A, ANTIONE Bob, & Madelin Jara M.A. (2004.) Assessment of post-stroke quality of life in cost-effectiveness studies: The usefulness of the Barthel Index and the EuroQoL-5D. Quality of Life Research, 13, 508-31     G codes: In compliance with CMSs Claims Based Outcome Reporting, the following G-code set was chosen for this patient based on their primary functional limitation being treated: The outcome measure chosen to determine the severity of the functional limitation was the Barthel Index with a score of 75/100 which was correlated with the impairment scale. ?  Self Care:     - CURRENT STATUS: CJ - 20%-39% impaired, limited or restricted    - GOAL STATUS: CJ - 20%-39% impaired, limited or restricted    - D/C STATUS:  CJ - 20%-39% impaired, limited or restricted     Occupational Therapy Evaluation Charge Determination   History Examination Decision-Making   LOW Complexity : Brief history review  LOW Complexity : 1-3 performance deficits relating to physical, cognitive , or psychosocial skils that result in activity limitations and / or participation restrictions  LOW Complexity : No comorbidities that affect functional and no verbal or physical assistance needed to complete eval tasks       Based on the above components, the patient evaluation is determined to be of the following complexity level: LOW   Pain:  Pain Scale 1: Numeric (0 - 10)  Pain Intensity 1: 0  Pain Location 1: Hip  Pain Orientation 1: Right  Pain Description 1: Aching  Pain Intervention(s) 1: Medication (see MAR); Ice  Activity Tolerance:   VSS throughout session. After treatment:   [x]  Patient left in no apparent distress sitting up in chair  []  Patient left in no apparent distress in bed  [x]  Call bell left within reach  [x]  Nursing notified  []  Caregiver present  []  Bed alarm activated    COMMUNICATION/EDUCATION:   Communication/Collaboration:  [x]      Home safety education was provided and the patient/caregiver indicated understanding. [x]      Patient/family have participated as able and agree with findings and recommendations. []      Patient is unable to participate in plan of care at this time.   Findings and recommendations were discussed with: Physical Therapist and Registered Nurse    Edgar Pappas OT  Time Calculation: 35 mins

## 2017-01-25 NOTE — PROGRESS NOTES
Problem: Discharge Planning  Goal: *Discharge to safe environment  Outcome: Progressing Towards Goal  Discharge Disposition: Home with Home Health

## 2017-01-25 NOTE — PROGRESS NOTES
Chart Reviewed: CM met with patient to review discharge needs. PT recommended home health. Patient chose to utilize [facility] for services. Family will transport home at discharge. CM sent referral to [facility] via [employer]. [facility] is willing to accept patient. Care Management Interventions  PCP Verified by CM: Yes (Dr. Brennen Dumont)  Mode of Transport at Discharge:  Other (see comment) (Family Transport)  Transition of Care Consult (CM Consult): [address]: No  Reason Outside [place]: Physician referred to specific agency ([facility])  Discharge Durable Medical Equipment: No  Physical Therapy Consult: Yes  Occupational Therapy Consult: Yes  Speech Therapy Consult: No  Current Support Network: Lives with Spouse, Own Home  Confirm Follow Up Transport: Family  Plan discussed with Pt/Family/Caregiver: Yes  Freedom of Choice Offered: Yes  Discharge Location  Discharge Placement: Home with home health    KUMAR Parham/CRM

## 2017-02-13 NOTE — DISCHARGE SUMMARY
@4OYAR@ [address] [place] [address]    DISCHARGE SUMMARY     Patient: Helen Bailey                             Medical Record Number: [id number]                : 1960  Age: 55 y.o. Admit Date: 2017  Discharge Date: 2017  Admission Diagnosis: OSTEO ARTHRITIS RIGHT HIP  Degenerative joint disease (DJD) of hip  Discharge Diagnosis: OSTEO ARTHRITIS RIGHT HIP  Procedures: Procedure(s):  RIGHT TOTAL HIP ARTHROPLASTY ANTERIOR APPROACH  Surgeon: Mariam Pompa MD  Assistants: Francesca Cintron PA-C  Anesthesia: general  Complications: None     History of Present Illness:  Helen Bailey is a 55 y.o. male with a history of Right hip pain, swelling, and marked loss of function. Despite conservative management and after clinical and radiographic evaluation, it was determined that he suffered from end-stage osteoarthritis and would benefit from Procedure(s):  RIGHT TOTAL HIP ARTHROPLASTY ANTERIOR APPROACH, which he consented to undergo after a discussion of the risks, benefits, alternatives, rehab concerns, and potential complications of surgery. Hospital Course:  Helen Bailey tolerated the procedure well. He was transferred  to the recovery room in stable condition. After a brief stay the patient was then transferred to the Joint Replacement Unit at [address] [place] [address].  On postoperative day #1, the dressing was clean and dry, he was neurovascularly intact. The patient was afebrile and vital signs were stable. Calves were soft and non-tender bilaterally. On postoperative day  # 1, the patient was tolerating a regular diet and making satisfactory progress with physical therapy.   Hemoglobin and INR prior to discharge were   Lab Results   Component Value Date/Time    HGB 12.2 2017 03:12 AM    INR 1.0 2017 08:45 AM   .  Helen Bailey made satisfactory progress with physical therapy and was discharged to Home in stable condition on postoperative day 1. He was provided with routine postoperative instructions and advised to follow up in my office in 3 weeks following discharge from the hospital.  He was prescribed aspirin for DVT prophylaxis and oxycodone for post-operative pain. Discharge Medications:  Cannot display discharge medications since this patient is not currently admitted.       Signed by: Tadeo Jones MD  2/12/2017

## 2017-08-09 ENCOUNTER — HOSPITAL ENCOUNTER (OUTPATIENT)
Dept: LAB | Age: 57
Discharge: HOME | End: 2017-08-09

## 2017-08-09 ENCOUNTER — OFFICE VISIT (OUTPATIENT)
Dept: DERMATOLOGY | Facility: AMBULATORY SURGERY CENTER | Age: 57
End: 2017-08-09

## 2017-08-09 VITALS
OXYGEN SATURATION: 97 % | TEMPERATURE: 99.1 F | WEIGHT: 247 LBS | DIASTOLIC BLOOD PRESSURE: 86 MMHG | HEART RATE: 76 BPM | SYSTOLIC BLOOD PRESSURE: 136 MMHG | BODY MASS INDEX: 35.36 KG/M2 | RESPIRATION RATE: 18 BRPM | HEIGHT: 70 IN

## 2017-08-09 DIAGNOSIS — D22.9 MULTIPLE BENIGN NEVI: ICD-10-CM

## 2017-08-09 DIAGNOSIS — D48.5 NEOPLASM OF UNCERTAIN BEHAVIOR OF SKIN OF ABDOMEN: ICD-10-CM

## 2017-08-09 DIAGNOSIS — L82.1 SEBORRHEIC KERATOSES: Primary | ICD-10-CM

## 2017-08-09 DIAGNOSIS — D48.5 NEOPLASM OF UNCERTAIN BEHAVIOR OF SKIN: ICD-10-CM

## 2017-08-09 DIAGNOSIS — D18.01 CHERRY ANGIOMA: ICD-10-CM

## 2017-08-09 NOTE — PROGRESS NOTES
Written by Luca Rivera, as dictated by Juliet Langford, [address]. Name: Maye Valadez       Age: 65 y.o. Date: 8/9/2017    Chief Complaint: No chief complaint on file. Subjective:    HPI  Mr. Maye Valadez is a 65 y.o. male who presents for a full skin exam.  The patient's last skin exam was on 07/28/2016 and the patient does have current complaints related to his skin. He has a new pink mole on his left abdomen that has been present for one year. He states he tried to pull it off initially. No associated symptoms. The patient's pertinent skin history includes : No personal history of skin cancer, father has psoriasis    ROS: Constitutional: Negative. Dermatological : positive for - skin lesion changes    Social History     Social History    Marital status:      Spouse name: N/A    Number of children: N/A    Years of education: N/A     Occupational History    Not on file.      Social History Main Topics    Smoking status: Never Smoker    Smokeless tobacco: Never Used    Alcohol use Yes      Comment: occassionally    Drug use: No    Sexual activity: Not on file     Other Topics Concern    Not on file     Social History Narrative       Family History   Problem Relation Age of Onset    Heart Disease Mother      CHF    Hypertension Mother     Lung Disease Father      COPD    Alcohol abuse Father     Heart Disease Sister      VALVE REPLACEMENT    Heart Attack Maternal Grandmother     Cancer Maternal Grandfather      COLON    Heart Attack Paternal Grandmother     Heart Attack Paternal Grandfather     Anesth Problems Neg Hx        Past Medical History:   Diagnosis Date    Chronic pain     Coagulation disorder (HCC)     HX ANEMIA    GERD (gastroesophageal reflux disease)     OCCASIONAL    Sleep apnea     CPAP       Past Surgical History:   Procedure Laterality Date    HX COLONOSCOPY      HX ENDOSCOPY      HX HEENT      WISDOM TEETH Current Outpatient Prescriptions   Medication Sig Dispense Refill    aspirin delayed-release 325 mg tablet Take 1 Tab by mouth two (2) times a day. 60 Tab 0    oxyCODONE IR (ROXICODONE) 5 mg immediate release tablet Take 1-2 Tabs by mouth every four (4) hours as needed for Pain. Max Daily Amount: 60 mg. 80 Tab 0    MULTIVITAMIN PO Take  by mouth daily.  B.infantis-B.ani-B.long-B.bifi (PROBIOTIC 4X) 10-15 mg TbEC Take  by mouth daily.  zinc 15 mg tab Take  by mouth daily.  famotidine (PEPCID) 20 mg tablet Take 20 mg by mouth as needed.  Cetirizine 10 mg cap Take  by mouth.  cholecalciferol (VITAMIN D3) 1,000 unit cap Take  by mouth daily. No Known Allergies      Objective:    Visit Vitals    /86 (BP 1 Location: Left arm, BP Patient Position: Sitting)    Pulse 76    Temp 99.1 °F (37.3 °C) (Oral)    Resp 18    Ht 5' 10\" (1.778 m)    Wt 112 kg (247 lb)    SpO2 97%    BMI 35.44 kg/m2       Kelsey Runner is a 65 y.o. male who appears well and in no distress. He is awake, alert, and oriented. There is no preauricular, submandibular, or cervical lymphadenopathy. A skin examination was performed including his scalp, face (including eyelids), ears, neck, chest, back, abdomen, upper extremities (including digits/nails), lower extremities, breasts, buttocks; genital skin was not examined. He has a 4 x 3 mm pink papule on his left abdomen. He has a 2 mm blue nevus versus traumatic tattoo on his anterior scalp. He has scattered waxy macules and keratotic papules consistent with seborrheic keratoses. He has pink intradermal nevi and brown junctional nevi, no concerning features. He has scattered red papules consistent with cherry angiomas. Assessment/Plan:    1. Neoplasm of Uncertain Behavior, left abdomen. The differential diagnoses were discussed. A shave biopsy was advised to sample this lesion.   The procedure was reviewed and verbal and written consent were obtained. The risks of pain, bleeding, infection, and scar were discussed. The patient is aware that this is a sample and is intended for diagnosis and not therapy of the skin lesion. I performed the procedure. The site was cleansed and anesthetized with 1% Lidocaine with Epinephrine 1:100,000. A shave biopsy was performed to sample the lesion. Drysol was used for hemostasis. The wound was bandaged and care reviewed. The specimen was sent to pathology. I will contact the patient with the results and any further treatment that may be necessary. 2. Neoplasm of Uncertain Behavior, anterior scalp. The differential diagnoses were discussed. A shave biopsy was advised to sample this lesion. The procedure was reviewed and verbal and written consent were obtained. The risks of pain, bleeding, infection, and scar were discussed. The patient is aware that this is a sample and is intended for diagnosis and not therapy of the skin lesion. I performed the procedure. The site was cleansed and anesthetized with 1% Lidocaine with Epinephrine 1:100,000. A shave biopsy was performed to sample the lesion. Drysol was used for hemostasis. The wound was bandaged and care reviewed. The specimen was sent to pathology. I will contact the patient with the results and any further treatment that may be necessary. 3. Seborrheic keratoses. The diagnosis was reviewed and the patient was reassured that no treatment is needed for these benign lesions. 4. Normal nevi. The diagnosis of normal nevi was reviewed. I discussed sun protection, sunscreen use, the warning signs of skin cancer, the need for self-skin examinations, and the need for regular practitioner exams every 1 year. The patient should follow up sooner as needed if new, changing, or symptomatic skin lesions arise. 5. Cherry angiomas.   The diagnosis was reviewed and the patient was reassured that no treatment is needed for these benign lesions. This plan was reviewed with the patient and patient agrees. All questions were answered. This scribe documentation was reviewed by me and accurately reflects the examination and decisions made by me.  [address]   OFFICE PROCEDURE PROGRESS NOTE   Chart reviewed for the following:   Antonio KOO, have reviewed the History, Physical and updated the Allergic reactions for Osmany Wynn. TIME OUT performed immediately prior to start of procedure:   Caroline KOO, have performed the following reviews on Osmany Wynn   prior to the start of the procedure:     * Patient was identified by name and date of birth   * Agreement on procedure being performed was verified   * Risks and Benefits explained to the patient   * Procedure site verified and marked as necessary   * Patient was positioned for comfort   * Consent was signed and verified     Time: 3:54 PM  Date of procedure: 8/9/2017  Procedure performed by: Karina Ingram  Provider assisted by:  Seng Anthoyn LPN    Patient assisted by: self   How tolerated by patient: tolerated the procedure well with no complications   Comments: none

## 2018-08-29 ENCOUNTER — OFFICE VISIT (OUTPATIENT)
Dept: DERMATOLOGY | Facility: AMBULATORY SURGERY CENTER | Age: 58
End: 2018-08-29

## 2018-08-29 VITALS
SYSTOLIC BLOOD PRESSURE: 104 MMHG | BODY MASS INDEX: 35.36 KG/M2 | HEART RATE: 60 BPM | WEIGHT: 247 LBS | OXYGEN SATURATION: 98 % | DIASTOLIC BLOOD PRESSURE: 60 MMHG | RESPIRATION RATE: 16 BRPM | HEIGHT: 70 IN | TEMPERATURE: 98.2 F

## 2018-08-29 DIAGNOSIS — L82.1 SEBORRHEIC KERATOSES: ICD-10-CM

## 2018-08-29 DIAGNOSIS — D18.01 CHERRY ANGIOMA: ICD-10-CM

## 2018-08-29 DIAGNOSIS — L82.0 INFLAMED SEBORRHEIC KERATOSIS: Primary | ICD-10-CM

## 2018-08-29 DIAGNOSIS — L84 CORNS AND CALLOSITIES: ICD-10-CM

## 2018-08-29 DIAGNOSIS — D22.9 MULTIPLE BENIGN NEVI: ICD-10-CM

## 2018-08-29 DIAGNOSIS — Z12.83 SCREENING FOR MALIGNANT NEOPLASM OF SKIN: ICD-10-CM

## 2018-08-29 DIAGNOSIS — L57.3 POIKILODERMA OF CIVATTE: ICD-10-CM

## 2018-08-29 PROBLEM — E66.01 SEVERE OBESITY (BMI 35.0-39.9): Status: ACTIVE | Noted: 2018-08-29

## 2018-08-29 NOTE — MR AVS SNAPSHOT
[facility] [address] 
[phone] Patient: Mike Beth MRN: [id number] Visit Information Date & Time Provider Department Dept. Phone Encounter #  
 8/29/2018  2:00 PM GABRIELLA Frye [id number] [phone] Your Appointments 8/29/2018  2:00 PM  
Office Visit with GABRIELLA Frye [facility]: est.pt annual skin exam  
 [facility] [address]  
[address] [facility] [address] [place] [id number] Upcoming Health Maintenance Date Due Hepatitis C Screening 1960 DTaP/Tdap/Td series (1 - Tdap) 3/24/1982 FOBT Q 1 YEAR AGE 50-75 3/24/2011 Influenza Age 5 to Adult 8/1/2018 Allergies as of 8/29/2018  Review Complete On: 8/29/2018 By: Odilia Simon No Known Allergies Current Immunizations  Never Reviewed No immunizations on file. Not reviewed this visit Vitals BP Pulse Temp Resp Height(growth percentile) Weight(growth percentile) 104/60 (BP 1 Location: Left arm, BP Patient Position: Sitting) 60 98.2 °F (36.8 °C) (Oral) 16 5' 10\" (1.778 m) 247 lb (112 kg) SpO2 BMI Smoking Status 98% 35.44 kg/m2 Never Smoker BMI and BSA Data Body Mass Index Body Surface Area  
 35.44 kg/m 2 2.35 m 2 [facility] [address] [facility] [address] Your Updated Medication List  
  
   
This list is accurate as of 8/29/18  1:50 PM.  Always use your most recent med list.  
  
  
  
  
 aspirin delayed-release 325 mg tablet Take 1 Tab by mouth two (2) times a day. Cetirizine 10 mg Cap Take  by mouth. cholecalciferol 1,000 unit Cap Commonly known as:  VITAMIN D3 Take  by mouth daily. famotidine 20 mg tablet Commonly known as:  PEPCID Take 20 mg by mouth as needed. MULTIVITAMIN PO Take  by mouth daily. oxyCODONE IR 5 mg immediate release tablet Commonly known as:  Lyndagisela Molinaen Take 1-2 Tabs by mouth every four (4) hours as needed for Pain. Max Daily Amount: 60 mg. PROBIOTIC 4X 10-15 mg Tbec Generic drug:  B.infantis-B.ani-B.long-B.bifi Take  by mouth daily. zinc 15 mg Tab Take  by mouth daily. Patient Instructions Self Skin Exam and Sunscreens Early detection and treatment is essential in the treatment of all forms of skin cancer. If caught early, all forms of skin cancer are curable. In addition to your regular visits, you should perform a monthly skin examination. Over time, you become familiar with what is normally found on your skin and can identify new or suspicious spots. One of the screening tools you can use to assess your skin is to follow the ABCDEs: 
 
A= Asymmetry (One half is unlike the other half) B= Border (An irregular, scalloped or poorly defined edge) C= Color (Is varied from one area to another, has shades of tan, brown/ black,       white, red or blue) D= Diameter (Spots larger than 6mm or a pencil eraser) E= Evolving (New spots or one that is changing in size, shape, or color) A follow- up interval will be customized based on your history of skin cancer or level of skin damage and risk factors. In any case, if you notice a suspicious or new spot, an appointment should be arranged between regular visits. Everyone should use sunscreen and sun-safe practices, which is especially important for those with a personal or family history of skin cancer. Suggestions for this include: 1. Use daily moisturizers containing SPF 30 or higher. 2. Wear long sleeve clothing with UPF ratings and a broad-brimmed hat. 3. Apply sunscreen with SPF 30 or higher to all sun exposed areas if you are going to be in the sun. A broad spectrum UVA/ UVB sunscreen is best.  Dont forget to REAPPLY every two hours or more often if swimming or sweating! 4. Avoid outside activities during peak sun hours, especially in the summer (10am- 2pm). 5. DO NOT use tanning beds. Using sunscreen and sun-safe practices can help reduce the likelihood of developing skin cancer or additional skin cancers in those previously diagnosed. Introducing [facility]! Lexi Diaz introduces [employer] patient portal. Now you can access parts of your medical record, email your doctor's office, and request medication refills online. 1. In your internet browser, go to [email or web address] 2. Click on the First Time User? Click Here link in the Sign In box. You will see the New Member Sign Up page. 3. Enter your [employer] Access Code exactly as it appears below. You will not need to use this code after youve completed the sign-up process. If you do not sign up before the expiration date, you must request a new code. · [employer] Access Code: [email or web address] Expires: 11/27/2018  1:50 PM 
 
4. Enter the last four digits of your Social Security Number (xxxx) and Date of Birth (mm/dd/yyyy) as indicated and click Submit. You will be taken to the next sign-up page. 5. Create a Big Live ID. This will be your [employer] Live login ID and cannot be changed, so think of one that is secure and easy to remember. 6. Create a [employer] Live password. You can change your password at any time. 7. Enter your Password Reset Question and Answer. This can be used at a later time if you forget your password. 8. Enter your e-mail address. You will receive e-mail notification when new information is available in [address] 9. Click Sign Up. You can now view and download portions of your medical record. 10. Click the Download Summary menu link to download a portable copy of your medical information. If you have questions, please visit the Frequently Asked Questions section of the [employer] website. Remember, [employer] is NOT to be used for urgent needs. For medical emergencies, dial 911. Now available from your iPhone and Android! Please provide this summary of care documentation to your next provider. Your primary care clinician is listed as Kristyn Stern. If you have any questions after today's visit, please call [phone].

## 2018-08-29 NOTE — PROGRESS NOTES
Written by Antoine Dowell, as dictated by Jackie Mercer, [address]. Name: Honor Libman       Age: 63 y.o. Date: 8/29/2018    Chief Complaint:   Chief Complaint   Patient presents with    Skin Exam     calus on left thumb       Subjective:    HPI  Mr. Honor Libman is a 63 y.o. male who presents for a full skin exam.  The patient's last skin exam was on 08/09/17 and the patient does have current complaints related to his skin. He reports a raised and bothersome lesion of built up skin on his left first finger that he cuts off every three weeks and the lesion returns. He also reports abrasions on his arms that take time to heal. His wife notes lesions on his back that look different and would like checked. He states some lesions on his back are itchy. He is feeling well and in his usual state of health today. He has no pain, no current illnesses, no other skin concerns. His allergies, medications, medical, and social history are reviewed by me today. The patient's pertinent skin history includes : No personal history of skin cancer, father has psoriasis    ROS: Constitutional: Negative. Dermatological : positive for - skin lesion changes    Social History     Social History    Marital status:      Spouse name: N/A    Number of children: N/A    Years of education: N/A     Occupational History    Not on file.      Social History Main Topics    Smoking status: Never Smoker    Smokeless tobacco: Never Used    Alcohol use Yes      Comment: occassionally    Drug use: No    Sexual activity: Not on file     Other Topics Concern    Not on file     Social History Narrative       Family History   Problem Relation Age of Onset    Heart Disease Mother      CHF    Hypertension Mother     Lung Disease Father      COPD    Alcohol abuse Father     Heart Disease Sister      VALVE REPLACEMENT    Heart Attack Maternal Grandmother     Cancer Maternal Grandfather      COLON    Heart Attack Paternal Grandmother     Heart Attack Paternal Grandfather     Anesth Problems Neg Hx        Past Medical History:   Diagnosis Date    Chronic pain     Coagulation disorder (HCC)     HX ANEMIA    GERD (gastroesophageal reflux disease)     OCCASIONAL    Sleep apnea     CPAP       Past Surgical History:   Procedure Laterality Date    HX COLONOSCOPY      HX ENDOSCOPY      HX HEENT      WISDOM TEETH       Current Outpatient Prescriptions   Medication Sig Dispense Refill    aspirin delayed-release 325 mg tablet Take 1 Tab by mouth two (2) times a day. 60 Tab 0    oxyCODONE IR (ROXICODONE) 5 mg immediate release tablet Take 1-2 Tabs by mouth every four (4) hours as needed for Pain. Max Daily Amount: 60 mg. 80 Tab 0    MULTIVITAMIN PO Take  by mouth daily.  B.infantis-B.ani-B.long-B.bifi (PROBIOTIC 4X) 10-15 mg TbEC Take  by mouth daily.  zinc 15 mg tab Take  by mouth daily.  famotidine (PEPCID) 20 mg tablet Take 20 mg by mouth as needed.  Cetirizine 10 mg cap Take  by mouth.  cholecalciferol (VITAMIN D3) 1,000 unit cap Take  by mouth daily. No Known Allergies      Objective:    Visit Vitals    /60 (BP 1 Location: Left arm, BP Patient Position: Sitting)    Pulse 60    Temp 98.2 °F (36.8 °C) (Oral)    Resp 16    Ht 5' 10\" (1.778 m)    Wt 112 kg (247 lb)    SpO2 98%    BMI 35.44 kg/m2       Len Wilder is a 63 y.o. male who appears well and in no distress. He is awake, alert, and oriented. There is no preauricular, submandibular, or cervical lymphadenopathy. A skin examination was performed including his scalp, face (including eyelids), ears, neck, chest, back, abdomen, upper extremities (including digits/nails), lower extremities, breasts, buttocks; genital skin was not examined. He has scattered waxy macules and keratotic papules consistent with seborrheic keratoses, including the lesions of concern on his arms and backs.  He has an inflamed seborrheic keratosis on his left upper back. The lesion of concern on his left first finger is most consistent with a corn/callus. He has milia cysts on his chest. He has pink intradermal nevi and brown junctional nevi, no concerning features for severe atypia. He has scattered red papules consistent with cherry angiomas. 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 Assessment/Plan:    1. Seborrheic keratoses. The diagnosis was reviewed and the patient was reassured that no treatment is needed for these benign lesions. 2. Inflamed seborrheic keratoses. The diagnosis of this precancerous lesion related to sun exposure was reviewed. Verbal consent was obtained. I treated 1 lesion with cryotherapy and post-cryotherapy care was reviewed. 3. Corn/callus, left first finger. I recommended to soak and pumice the lesion. I also advised to reduce pressure on the lesion to prevent the lesion from recurring. 4. Normal nevi. The diagnosis of normal nevi was reviewed. I discussed sun protection, sunscreen use, the warning signs of skin cancer, the need for self-skin examinations, and the need for regular practitioner exams every 1-2 year. The patient should follow up sooner as needed if new, changing, or symptomatic skin lesions arise. 5. Cherry angiomas. The diagnosis was reviewed and the patient was reassured that no treatment is needed for these benign lesions. This plan was reviewed with the patient and patient agrees. All questions were answered. This scribe documentation was reviewed by me and accurately reflects the examination and decisions made by me. [facility]   OFFICE PROCEDURE PROGRESS NOTE   Chart reviewed for the following:   IAntonio, have reviewed the History, Physical and updated the Allergic reactions for Loree Thomas.     TIME OUT performed immediately prior to start of procedure:   Corry KOO, have performed the following reviews on Kristen West   prior to the start of the procedure:     * Patient was identified by name and date of birth   * Agreement on procedure being performed was verified   * Risks and Benefits explained to the patient   * Procedure site verified and marked as necessary   * Patient was positioned for comfort   * Consent was signed and verified     Time: 2:10 PM  Date of procedure: 8/29/2018  Procedure performed by: Armida Frost.  Lennard Carrel, DNP  Provider assisted by: self   Patient assisted by: self   How tolerated by patient: tolerated the procedure well with no complications   Comments: none

## 2018-11-20 ENCOUNTER — HOSPITAL ENCOUNTER (EMERGENCY)
Age: 58
Discharge: HOME | End: 2018-11-20
Attending: EMERGENCY MEDICINE
Payer: COMMERCIAL

## 2018-11-20 VITALS
OXYGEN SATURATION: 98 % | RESPIRATION RATE: 14 BRPM | DIASTOLIC BLOOD PRESSURE: 61 MMHG | TEMPERATURE: 99.3 F | BODY MASS INDEX: 34.31 KG/M2 | WEIGHT: 239.64 LBS | SYSTOLIC BLOOD PRESSURE: 127 MMHG | HEIGHT: 70 IN | HEART RATE: 52 BPM

## 2018-11-20 DIAGNOSIS — H11.32 SUBCONJUNCTIVAL HEMORRHAGE, TRAUMATIC, LEFT: Primary | ICD-10-CM

## 2018-11-20 DIAGNOSIS — S05.02XA CONJUNCTIVAL ABRASION, LEFT, INITIAL ENCOUNTER: ICD-10-CM

## 2018-11-20 PROCEDURE — 74011250637 HC RX REV CODE- 250/637: Performed by: EMERGENCY MEDICINE

## 2018-11-20 PROCEDURE — 99284 EMERGENCY DEPT VISIT MOD MDM: CPT

## 2018-11-20 PROCEDURE — 74011000250 HC RX REV CODE- 250: Performed by: EMERGENCY MEDICINE

## 2018-11-20 RX ORDER — ERYTHROMYCIN 5 MG/G
OINTMENT OPHTHALMIC
Qty: 3.5 G | Refills: 0 | Status: SHIPPED | OUTPATIENT
Start: 2018-11-20 | End: 2019-02-06

## 2018-11-20 RX ORDER — ERYTHROMYCIN 5 MG/G
OINTMENT OPHTHALMIC
Status: COMPLETED | OUTPATIENT
Start: 2018-11-20 | End: 2018-11-20

## 2018-11-20 RX ORDER — TETRACAINE HYDROCHLORIDE 5 MG/ML
1 SOLUTION OPHTHALMIC
Status: COMPLETED | OUTPATIENT
Start: 2018-11-20 | End: 2018-11-20

## 2018-11-20 RX ADMIN — ERYTHROMYCIN: 5 OINTMENT OPHTHALMIC at 19:21

## 2018-11-20 RX ADMIN — TETRACAINE HYDROCHLORIDE 1 DROP: 5 SOLUTION OPHTHALMIC at 19:00

## 2018-11-20 RX ADMIN — FLUORESCEIN SODIUM 1 STRIP: 1 STRIP OPHTHALMIC at 19:00

## 2018-11-20 NOTE — ED PROVIDER NOTES
HPI   63 y.o. male, non-contact lens wearer presents to the ED stating that he was driving a tractor and had a sapling swing back and strike him in the left eye. He was not wearing protective goggles or glasses. This happened about 2 hours PTA. He states that since the injury he has had a sharp foreign body sensation to the lateral portion of his eye with increasing swelling. He has noted some very mild blurring to his vision in that eye, but denies any photophobia, LOC, HA, vomiting, numbness, weakness, difficulty speaking, or other injuries.      Past Medical History:   Diagnosis Date    Chronic pain     Coagulation disorder (HCC)     HX ANEMIA    GERD (gastroesophageal reflux disease)     OCCASIONAL    Sleep apnea     CPAP       Past Surgical History:   Procedure Laterality Date    HX COLONOSCOPY      HX ENDOSCOPY      HX HEENT      WISDOM TEETH    HX HIP REPLACEMENT Right 2016    total         Family History:   Problem Relation Age of Onset    Heart Disease Mother         CHF    Hypertension Mother     Lung Disease Father         COPD    Alcohol abuse Father     Heart Disease Sister         VALVE REPLACEMENT    Heart Attack Maternal Grandmother     Cancer Maternal Grandfather         COLON    Heart Attack Paternal Grandmother     Heart Attack Paternal Grandfather     Anesth Problems Neg Hx        Social History     Socioeconomic History    Marital status:      Spouse name: Not on file    Number of children: Not on file    Years of education: Not on file    Highest education level: Not on file   Social Needs    Financial resource strain: Not on file    Food insecurity - worry: Not on file    Food insecurity - inability: Not on file   [employer] needs - medical: Not on file   [employer] needs - non-medical: Not on file   Occupational History    Not on file   Tobacco Use    Smoking status: Never Smoker    Smokeless tobacco: Never Used   Substance and Sexual Activity    Alcohol use: Yes     Comment: occassionally    Drug use: No    Sexual activity: Not on file   Other Topics Concern    Not on file   Social History Narrative    Not on file         ALLERGIES: Patient has no known allergies. Review of Systems   Constitutional: Negative for activity change, appetite change, chills and fever. HENT: Negative for congestion, rhinorrhea, sinus pain, sneezing and sore throat. Eyes: Positive for pain, redness and visual disturbance (slight blurring of his vision in the left eye, compared to the right). Negative for photophobia and discharge. Respiratory: Negative for cough and shortness of breath. Cardiovascular: Negative for chest pain. Gastrointestinal: Negative for abdominal pain, blood in stool, constipation, diarrhea, nausea and vomiting. Genitourinary: Negative for difficulty urinating, dysuria, flank pain, hematuria, penile pain and testicular pain. Musculoskeletal: Negative for arthralgias, back pain, myalgias and neck pain. Skin: Negative for rash and wound. Neurological: Negative for syncope, weakness, light-headedness, numbness and headaches. Psychiatric/Behavioral: Negative for self-injury and suicidal ideas. All other systems reviewed and are negative. Vitals:    11/20/18 1815   BP: 127/61   Pulse: (!) 52   Resp: 14   Temp: 99.3 °F (37.4 °C)   SpO2: 98%   Weight: 108.7 kg (239 lb 10.2 oz)   Height: 5' 10\" (1.778 m)            Physical Exam   Constitutional: He is oriented to person, place, and time. He appears well-developed and well-nourished. No distress. HENT:   Head: Normocephalic. Right Ear: External ear normal.   Left Ear: External ear normal.   Nose: Nose normal.   Mouth/Throat: Oropharynx is clear and moist.   Eyes: EOM and lids are normal. Pupils are equal, round, and reactive to light. Lids are everted and swept, no foreign bodies found.  Left eye exhibits chemosis (small area of chemosis associated with subconjunctival hemorrhage). Left eye exhibits no discharge. No foreign body present in the left eye. Left conjunctiva has a hemorrhage. Slit lamp exam:       The right eye shows no hyphema and no hypopyon. The left eye shows fluorescein uptake (laterally with conjunctival abrasion noted. neg Krishna's sign). The left eye shows no corneal abrasion, no corneal flare, no corneal ulcer, no foreign body, no hyphema and no hypopyon. 20/30 in right, 20/40 in left, 20/30 in left. No photophobia   Neck: Normal range of motion. Neck supple. Cardiovascular: Normal rate, regular rhythm, normal heart sounds and intact distal pulses. Pulmonary/Chest: Effort normal and breath sounds normal. No respiratory distress. Abdominal: Soft. He exhibits no distension. There is no tenderness. Musculoskeletal: He exhibits no edema or tenderness. Neurological: He is alert and oriented to person, place, and time. He has normal strength. No cranial nerve deficit or sensory deficit. Coordination and gait normal. GCS eye subscore is 4. GCS verbal subscore is 5. GCS motor subscore is 6. Intact finger to nose, fluent speech, 5/5 strength, normal sensation, fluent speech    Skin: Skin is warm and dry. He is not diaphoretic. Nursing note and vitals reviewed. MDM    63 y.o. male presents to the ED after he was struck in the eye by a branch while riding a tractor. Exam as above with punctate area of chemosis and subconj hemorrhage with small abrasion. Pain resolved after tetracaine gtt. No evidence of hyphema or globe rupture. Given dose of erythromycin ointment in the ED and Rx'd the same. Rec'd close ophthalmology f/u in the next few days for further evaluation of his sx. Strict return precautions were given for worsening or concerns. This was discussed with the patient and his wife at the bedside and they stated both understanding and agreement.      Procedures

## 2018-11-20 NOTE — ED TRIAGE NOTES
Pt was out clearing brush and something hit him in the left eye. Denies visual distrubances.  Some bleeding noted to the left of his iris in the sclera

## 2018-11-21 NOTE — ED NOTES
Discharge instructions reviewed with pt and copy given along with RX by ER MD Nelida Campbell. Education provided by this RN on follow up appointments and hand hygiene and eye care instructions. Pt verbalized understanding of all education prior to leaving ED. Pt ambulatory from ED accompanied by spouse in no sign of distress or discomfort at this time.

## 2019-02-05 ENCOUNTER — HOSPITAL ENCOUNTER (EMERGENCY)
Age: 59
Discharge: LEFT BEFORE DISCHARGE | End: 2019-02-05
Attending: EMERGENCY MEDICINE
Payer: COMMERCIAL

## 2019-02-05 VITALS
BODY MASS INDEX: 35.6 KG/M2 | OXYGEN SATURATION: 100 % | DIASTOLIC BLOOD PRESSURE: 72 MMHG | SYSTOLIC BLOOD PRESSURE: 146 MMHG | WEIGHT: 248.68 LBS | RESPIRATION RATE: 20 BRPM | TEMPERATURE: 98.5 F | HEIGHT: 70 IN | HEART RATE: 79 BPM

## 2019-02-05 DIAGNOSIS — Z53.29 LEFT AGAINST MEDICAL ADVICE: ICD-10-CM

## 2019-02-05 DIAGNOSIS — D62 ACUTE BLOOD LOSS ANEMIA: Primary | ICD-10-CM

## 2019-02-05 LAB
ALBUMIN SERPL-MCNC: 4 G/DL (ref 3.5–5)
ALBUMIN/GLOB SERPL: 1.3 {RATIO} (ref 1.1–2.2)
ALP SERPL-CCNC: 64 U/L (ref 45–117)
ALT SERPL-CCNC: 29 U/L (ref 12–78)
ANION GAP SERPL CALC-SCNC: 7 MMOL/L (ref 5–15)
AST SERPL-CCNC: 16 U/L (ref 15–37)
BASOPHILS # BLD: 0 K/UL (ref 0–0.1)
BASOPHILS NFR BLD: 0 % (ref 0–1)
BILIRUB SERPL-MCNC: 0.3 MG/DL (ref 0.2–1)
BUN SERPL-MCNC: 17 MG/DL (ref 6–20)
BUN/CREAT SERPL: 17 (ref 12–20)
CALCIUM SERPL-MCNC: 8.6 MG/DL (ref 8.5–10.1)
CHLORIDE SERPL-SCNC: 109 MMOL/L (ref 97–108)
CO2 SERPL-SCNC: 24 MMOL/L (ref 21–32)
COMMENT, HOLDF: NORMAL
CREAT SERPL-MCNC: 0.99 MG/DL (ref 0.7–1.3)
DIFFERENTIAL METHOD BLD: ABNORMAL
EOSINOPHIL # BLD: 0.1 K/UL (ref 0–0.4)
EOSINOPHIL NFR BLD: 3 % (ref 0–7)
ERYTHROCYTE [DISTWIDTH] IN BLOOD BY AUTOMATED COUNT: 18.6 % (ref 11.5–14.5)
GLOBULIN SER CALC-MCNC: 3.2 G/DL (ref 2–4)
GLUCOSE SERPL-MCNC: 96 MG/DL (ref 65–100)
HCT VFR BLD AUTO: 23.2 % (ref 36.6–50.3)
HGB BLD-MCNC: 6.2 G/DL (ref 12.1–17)
IMM GRANULOCYTES # BLD AUTO: 0 K/UL
IMM GRANULOCYTES NFR BLD AUTO: 0 %
LYMPHOCYTES # BLD: 1.1 K/UL (ref 0.8–3.5)
LYMPHOCYTES NFR BLD: 24 % (ref 12–49)
MCH RBC QN AUTO: 20 PG (ref 26–34)
MCHC RBC AUTO-ENTMCNC: 26.7 G/DL (ref 30–36.5)
MCV RBC AUTO: 74.8 FL (ref 80–99)
MONOCYTES # BLD: 0.3 K/UL (ref 0–1)
MONOCYTES NFR BLD: 6 % (ref 5–13)
NEUTS SEG # BLD: 2.9 K/UL (ref 1.8–8)
NEUTS SEG NFR BLD: 67 % (ref 32–75)
NRBC # BLD: 0 K/UL (ref 0–0.01)
NRBC BLD-RTO: 0 PER 100 WBC
PLATELET # BLD AUTO: 237 K/UL (ref 150–400)
PMV BLD AUTO: 10 FL (ref 8.9–12.9)
POTASSIUM SERPL-SCNC: 3.8 MMOL/L (ref 3.5–5.1)
PROT SERPL-MCNC: 7.2 G/DL (ref 6.4–8.2)
RBC # BLD AUTO: 3.1 M/UL (ref 4.1–5.7)
RBC MORPH BLD: ABNORMAL
SAMPLES BEING HELD,HOLD: NORMAL
SODIUM SERPL-SCNC: 140 MMOL/L (ref 136–145)
WBC # BLD AUTO: 4.4 K/UL (ref 4.1–11.1)

## 2019-02-05 PROCEDURE — 86900 BLOOD TYPING SEROLOGIC ABO: CPT

## 2019-02-05 PROCEDURE — 82550 ASSAY OF CK (CPK): CPT

## 2019-02-05 PROCEDURE — 99282 EMERGENCY DEPT VISIT SF MDM: CPT

## 2019-02-05 PROCEDURE — 84484 ASSAY OF TROPONIN QUANT: CPT

## 2019-02-05 PROCEDURE — 83690 ASSAY OF LIPASE: CPT

## 2019-02-05 PROCEDURE — 93005 ELECTROCARDIOGRAM TRACING: CPT

## 2019-02-05 PROCEDURE — 85025 COMPLETE CBC W/AUTO DIFF WBC: CPT

## 2019-02-05 PROCEDURE — 86923 COMPATIBILITY TEST ELECTRIC: CPT

## 2019-02-05 PROCEDURE — 36415 COLL VENOUS BLD VENIPUNCTURE: CPT

## 2019-02-05 PROCEDURE — 80053 COMPREHEN METABOLIC PANEL: CPT

## 2019-02-05 PROCEDURE — 83735 ASSAY OF MAGNESIUM: CPT

## 2019-02-05 PROCEDURE — 85610 PROTHROMBIN TIME: CPT

## 2019-02-05 PROCEDURE — 85730 THROMBOPLASTIN TIME PARTIAL: CPT

## 2019-02-05 RX ORDER — SODIUM CHLORIDE 9 MG/ML
250 INJECTION, SOLUTION INTRAVENOUS AS NEEDED
Status: DISCONTINUED | OUTPATIENT
Start: 2019-02-05 | End: 2019-02-06 | Stop reason: HOSPADM

## 2019-02-06 ENCOUNTER — HOSPITAL ENCOUNTER (OUTPATIENT)
Age: 59
Setting detail: OBSERVATION
Discharge: HOME | End: 2019-02-08
Attending: STUDENT IN AN ORGANIZED HEALTH CARE EDUCATION/TRAINING PROGRAM | Admitting: HOSPITALIST
Payer: COMMERCIAL

## 2019-02-06 ENCOUNTER — APPOINTMENT (OUTPATIENT)
Dept: GENERAL RADIOLOGY | Age: 59
End: 2019-02-06
Attending: STUDENT IN AN ORGANIZED HEALTH CARE EDUCATION/TRAINING PROGRAM
Payer: COMMERCIAL

## 2019-02-06 DIAGNOSIS — D64.9 SYMPTOMATIC ANEMIA: Primary | ICD-10-CM

## 2019-02-06 LAB
ABO + RH BLD: NORMAL
ALBUMIN SERPL-MCNC: 3.7 G/DL (ref 3.5–5)
ALBUMIN/GLOB SERPL: 1.2 {RATIO} (ref 1.1–2.2)
ALP SERPL-CCNC: 62 U/L (ref 45–117)
ALT SERPL-CCNC: 36 U/L (ref 12–78)
ANION GAP SERPL CALC-SCNC: 12 MMOL/L (ref 5–15)
APTT PPP: 24.8 SEC (ref 22.1–32)
AST SERPL-CCNC: 19 U/L (ref 15–37)
ATRIAL RATE: 61 BPM
ATRIAL RATE: 72 BPM
BASOPHILS # BLD: 0 K/UL (ref 0–0.1)
BASOPHILS NFR BLD: 0 % (ref 0–1)
BILIRUB SERPL-MCNC: 0.4 MG/DL (ref 0.2–1)
BLD PROD TYP BPU: NORMAL
BLD PROD TYP BPU: NORMAL
BLOOD GROUP ANTIBODIES SERPL: NORMAL
BPU ID: NORMAL
BPU ID: NORMAL
BUN SERPL-MCNC: 16 MG/DL (ref 6–20)
BUN/CREAT SERPL: 16 (ref 12–20)
CALCIUM SERPL-MCNC: 8.7 MG/DL (ref 8.5–10.1)
CALCULATED P AXIS, ECG09: 48 DEGREES
CALCULATED P AXIS, ECG09: 49 DEGREES
CALCULATED R AXIS, ECG10: -2 DEGREES
CALCULATED R AXIS, ECG10: 17 DEGREES
CALCULATED T AXIS, ECG11: -2 DEGREES
CALCULATED T AXIS, ECG11: -8 DEGREES
CHLORIDE SERPL-SCNC: 103 MMOL/L (ref 97–108)
CK MB CFR SERPL CALC: 1.1 % (ref 0–2.5)
CK MB SERPL-MCNC: 1.1 NG/ML (ref 5–25)
CK SERPL-CCNC: 100 U/L (ref 39–308)
CO2 SERPL-SCNC: 25 MMOL/L (ref 21–32)
COMMENT, HOLDF: NORMAL
CREAT SERPL-MCNC: 1.03 MG/DL (ref 0.7–1.3)
CROSSMATCH RESULT,%XM: NORMAL
CROSSMATCH RESULT,%XM: NORMAL
DIAGNOSIS, 93000: NORMAL
DIAGNOSIS, 93000: NORMAL
DIFFERENTIAL METHOD BLD: ABNORMAL
EOSINOPHIL # BLD: 0.1 K/UL (ref 0–0.4)
EOSINOPHIL NFR BLD: 3 % (ref 0–7)
ERYTHROCYTE [DISTWIDTH] IN BLOOD BY AUTOMATED COUNT: 18.5 % (ref 11.5–14.5)
GLOBULIN SER CALC-MCNC: 3 G/DL (ref 2–4)
GLUCOSE SERPL-MCNC: 92 MG/DL (ref 65–100)
HCT VFR BLD AUTO: 21.5 % (ref 36.6–50.3)
HGB BLD-MCNC: 6 G/DL (ref 12.1–17)
IMM GRANULOCYTES # BLD AUTO: 0 K/UL (ref 0–0.04)
IMM GRANULOCYTES NFR BLD AUTO: 0 % (ref 0–0.5)
INR PPP: 1 (ref 0.9–1.1)
LIPASE SERPL-CCNC: 127 U/L (ref 73–393)
LYMPHOCYTES # BLD: 0.8 K/UL (ref 0.8–3.5)
LYMPHOCYTES NFR BLD: 20 % (ref 12–49)
MAGNESIUM SERPL-MCNC: 2.4 MG/DL (ref 1.6–2.4)
MCH RBC QN AUTO: 19.9 PG (ref 26–34)
MCHC RBC AUTO-ENTMCNC: 27.9 G/DL (ref 30–36.5)
MCV RBC AUTO: 71.2 FL (ref 80–99)
MONOCYTES # BLD: 0.5 K/UL (ref 0–1)
MONOCYTES NFR BLD: 13 % (ref 5–13)
NEUTS SEG # BLD: 2.8 K/UL (ref 1.8–8)
NEUTS SEG NFR BLD: 64 % (ref 32–75)
NRBC # BLD: 0 K/UL (ref 0–0.01)
NRBC BLD-RTO: 0 PER 100 WBC
P-R INTERVAL, ECG05: 160 MS
P-R INTERVAL, ECG05: 162 MS
PLATELET # BLD AUTO: 216 K/UL (ref 150–400)
PMV BLD AUTO: 9.4 FL (ref 8.9–12.9)
POTASSIUM SERPL-SCNC: 3.9 MMOL/L (ref 3.5–5.1)
PROT SERPL-MCNC: 6.7 G/DL (ref 6.4–8.2)
PROTHROMBIN TIME: 10.3 SEC (ref 9–11.1)
Q-T INTERVAL, ECG07: 410 MS
Q-T INTERVAL, ECG07: 412 MS
QRS DURATION, ECG06: 96 MS
QRS DURATION, ECG06: 98 MS
QTC CALCULATION (BEZET), ECG08: 414 MS
QTC CALCULATION (BEZET), ECG08: 448 MS
RBC # BLD AUTO: 3.02 M/UL (ref 4.1–5.7)
RBC MORPH BLD: ABNORMAL
SAMPLES BEING HELD,HOLD: NORMAL
SODIUM SERPL-SCNC: 140 MMOL/L (ref 136–145)
SPECIMEN EXP DATE BLD: NORMAL
STATUS OF UNIT,%ST: NORMAL
STATUS OF UNIT,%ST: NORMAL
THERAPEUTIC RANGE,PTTT: NORMAL SECS (ref 58–77)
TROPONIN I SERPL-MCNC: <0.05 NG/ML
TROPONIN I SERPL-MCNC: <0.05 NG/ML
UNIT DIVISION, %UDIV: 0
UNIT DIVISION, %UDIV: 0
VENTRICULAR RATE, ECG03: 61 BPM
VENTRICULAR RATE, ECG03: 72 BPM
WBC # BLD AUTO: 4.2 K/UL (ref 4.1–11.1)

## 2019-02-06 PROCEDURE — 85025 COMPLETE CBC W/AUTO DIFF WBC: CPT

## 2019-02-06 PROCEDURE — 93005 ELECTROCARDIOGRAM TRACING: CPT

## 2019-02-06 PROCEDURE — 71045 X-RAY EXAM CHEST 1 VIEW: CPT

## 2019-02-06 PROCEDURE — 36430 TRANSFUSION BLD/BLD COMPNT: CPT

## 2019-02-06 PROCEDURE — 99218 HC RM OBSERVATION: CPT

## 2019-02-06 PROCEDURE — 86900 BLOOD TYPING SEROLOGIC ABO: CPT

## 2019-02-06 PROCEDURE — 96374 THER/PROPH/DIAG INJ IV PUSH: CPT

## 2019-02-06 PROCEDURE — 86923 COMPATIBILITY TEST ELECTRIC: CPT

## 2019-02-06 PROCEDURE — 99284 EMERGENCY DEPT VISIT MOD MDM: CPT

## 2019-02-06 PROCEDURE — 36415 COLL VENOUS BLD VENIPUNCTURE: CPT

## 2019-02-06 PROCEDURE — 74011000250 HC RX REV CODE- 250: Performed by: FAMILY MEDICINE

## 2019-02-06 PROCEDURE — P9016 RBC LEUKOCYTES REDUCED: HCPCS

## 2019-02-06 PROCEDURE — C9113 INJ PANTOPRAZOLE SODIUM, VIA: HCPCS | Performed by: FAMILY MEDICINE

## 2019-02-06 PROCEDURE — 80053 COMPREHEN METABOLIC PANEL: CPT

## 2019-02-06 PROCEDURE — 74011250636 HC RX REV CODE- 250/636: Performed by: FAMILY MEDICINE

## 2019-02-06 RX ORDER — LANOLIN ALCOHOL/MO/W.PET/CERES
400 CREAM (GRAM) TOPICAL DAILY
Status: DISCONTINUED | OUTPATIENT
Start: 2019-02-07 | End: 2019-02-08 | Stop reason: HOSPADM

## 2019-02-06 RX ORDER — SODIUM CHLORIDE 0.9 % (FLUSH) 0.9 %
5-40 SYRINGE (ML) INJECTION AS NEEDED
Status: DISCONTINUED | OUTPATIENT
Start: 2019-02-06 | End: 2019-02-08 | Stop reason: HOSPADM

## 2019-02-06 RX ORDER — SODIUM CHLORIDE 9 MG/ML
250 INJECTION, SOLUTION INTRAVENOUS AS NEEDED
Status: DISCONTINUED | OUTPATIENT
Start: 2019-02-06 | End: 2019-02-08 | Stop reason: HOSPADM

## 2019-02-06 RX ORDER — CALCIUM CARBONATE 600 MG
600 TABLET ORAL 2 TIMES DAILY
COMMUNITY

## 2019-02-06 RX ORDER — LANOLIN ALCOHOL/MO/W.PET/CERES
400 CREAM (GRAM) TOPICAL DAILY
COMMUNITY

## 2019-02-06 RX ORDER — SODIUM CHLORIDE 0.9 % (FLUSH) 0.9 %
5-40 SYRINGE (ML) INJECTION EVERY 8 HOURS
Status: DISCONTINUED | OUTPATIENT
Start: 2019-02-06 | End: 2019-02-08 | Stop reason: HOSPADM

## 2019-02-06 RX ORDER — SODIUM CHLORIDE 9 MG/ML
75 INJECTION, SOLUTION INTRAVENOUS CONTINUOUS
Status: DISCONTINUED | OUTPATIENT
Start: 2019-02-06 | End: 2019-02-08 | Stop reason: HOSPADM

## 2019-02-06 RX ORDER — ERGOCALCIFEROL 1.25 MG/1
50000 CAPSULE ORAL
COMMUNITY

## 2019-02-06 RX ORDER — SODIUM CHLORIDE 9 MG/ML
1000 INJECTION, SOLUTION INTRAVENOUS CONTINUOUS
Status: DISCONTINUED | OUTPATIENT
Start: 2019-02-06 | End: 2019-02-06 | Stop reason: SDUPTHER

## 2019-02-06 RX ORDER — PHYTONADIONE 5 MG/1
TABLET ORAL
COMMUNITY

## 2019-02-06 RX ORDER — SODIUM CHLORIDE 9 MG/ML
1000 INJECTION, SOLUTION INTRAVENOUS CONTINUOUS
Status: DISCONTINUED | OUTPATIENT
Start: 2019-02-06 | End: 2019-02-06

## 2019-02-06 RX ORDER — OMEGA-3-ACID ETHYL ESTERS 1 G/1
1 CAPSULE, LIQUID FILLED ORAL
COMMUNITY
End: 2019-03-05

## 2019-02-06 RX ORDER — MILK THISTLE 500 MG
CAPSULE ORAL
COMMUNITY

## 2019-02-06 RX ORDER — CHOLECALCIFEROL (VITAMIN D3) 125 MCG
100 CAPSULE ORAL DAILY
COMMUNITY
End: 2019-02-20

## 2019-02-06 RX ORDER — LORATADINE 10 MG/1
10 TABLET ORAL
COMMUNITY

## 2019-02-06 RX ADMIN — SODIUM CHLORIDE 40 MG: 9 INJECTION, SOLUTION INTRAMUSCULAR; INTRAVENOUS; SUBCUTANEOUS at 22:33

## 2019-02-06 RX ADMIN — Medication 10 ML: at 22:33

## 2019-02-06 RX ADMIN — SODIUM CHLORIDE 75 ML/HR: 900 INJECTION, SOLUTION INTRAVENOUS at 20:56

## 2019-02-06 NOTE — ED TRIAGE NOTES
Sent from PCP for low Hg. He went to see his PCP due to SOB with activity. He also had some periods of dizziness.

## 2019-02-06 NOTE — ED TRIAGE NOTES
Pt c/o sob x 4 weeks, went to family  and hgb was 6.3 per pt. Went to [facility] ER last night, left AMA d/t wait. Hx of hemorrhoids, no other bleeding.

## 2019-02-06 NOTE — ED PROVIDER NOTES
HPI   The patient is a 44-year-old male with a past medical history significant for chronic pain, anemia, GERD, sleep apnea, who presents to the ed with a complaint of shortness of breath on exertion. Over the past 2-3 weeks. The patient also complained of being fatigued and tired with little effort. He was seen by his PCP and sent to the ER because of low hemoglobin. The patient has a history of external hemorrhoids. Patient denies any headache, sore throat, cough, congestion, blurred vision, chest pain, shortness of breath, nausea, vomiting, abdominal pain, diarrhea, constipation, dysuria, hematuria, vaginal discharge or bleeding, dizziness, weakness, numbness, melena, hematochezia, hematemesis.     Past Medical History:   Diagnosis Date    Chronic pain     Coagulation disorder (HCC)     HX ANEMIA    GERD (gastroesophageal reflux disease)     OCCASIONAL    Sleep apnea     CPAP       Past Surgical History:   Procedure Laterality Date    HX COLONOSCOPY      HX ENDOSCOPY      HX HEENT      WISDOM TEETH    HX HIP REPLACEMENT Right 2016    total         Family History:   Problem Relation Age of Onset    Heart Disease Mother         CHF    Hypertension Mother     Lung Disease Father         COPD    Alcohol abuse Father     Heart Disease Sister         VALVE REPLACEMENT    Heart Attack Maternal Grandmother     Cancer Maternal Grandfather         COLON    Heart Attack Paternal Grandmother     Heart Attack Paternal Grandfather     Anesth Problems Neg Hx        Social History     Socioeconomic History    Marital status:      Spouse name: Not on file    Number of children: Not on file    Years of education: Not on file    Highest education level: Not on file   Social Needs    Financial resource strain: Not on file    Food insecurity - worry: Not on file    Food insecurity - inability: Not on file   [employer] needs - medical: Not on file   [employer] needs - non-medical: Not on file Occupational History    Not on file   Tobacco Use    Smoking status: Never Smoker    Smokeless tobacco: Never Used   Substance and Sexual Activity    Alcohol use: Yes     Comment: occassionally    Drug use: No    Sexual activity: Not on file   Other Topics Concern    Not on file   Social History Narrative    Not on file         ALLERGIES: Patient has no known allergies. Review of Systems   All other systems reviewed and are negative. Vitals:    02/05/19 1911 02/05/19 2002 02/05/19 2205   BP:  129/66 146/72   Pulse:  65 79   Resp:  18 20   Temp:  98 °F (36.7 °C) 98.5 °F (36.9 °C)   SpO2: 100% 100%    Weight:  112.8 kg (248 lb 10.9 oz)    Height:  5' 10\" (1.778 m)             Physical Exam   Nursing note and vitals reviewed. CONSTITUTIONAL: Well-appearing; well-nourished; in no apparent distress  HEAD: Normocephalic; atraumatic  EYES: PERRL; EOM intact; conjunctiva and sclera are clear bilaterally. ENT: No rhinorrhea; normal pharynx with no tonsillar hypertrophy; mucous membranes pink/moist, no erythema, no exudate. NECK: Supple; non-tender; no cervical lymphadenopathy  CARD: Normal S1, S2; no murmurs, rubs, or gallops. Regular rate and rhythm. RESP: Normal respiratory effort; breath sounds clear and equal bilaterally; no wheezes, rhonchi, or rales. ABD: Normal bowel sounds; non-distended; non-tender; no palpable organomegaly, no masses, no bruits. Back Exam: Normal inspection; no vertebral point tenderness, no CVA tenderness. Normal range of motion. EXT: Normal ROM in all four extremities; non-tender to palpation; no swelling or deformity; distal pulses are normal, no edema. SKIN: Warm; dry; no rash.   NEURO:Alert and oriented x 3, coherent, ARNOLDO-XII grossly intact, sensory and motor are non-focal.        MDM  Number of Diagnoses or Management Options  Acute blood loss anemia:   Left against medical advice:   Diagnosis management comments:   Assessment: 72-year-old male with anemia rule out GI bleed. The patient appears hemodynamically stable. Plan: EKG/ lab/ chest x-ray/ IV fluid/ Protonix/ GI consult/ hospitalist consult/ blood transfusion/ monitor and reevaluate       Amount and/or Complexity of Data Reviewed  Clinical lab tests: ordered and reviewed  Tests in the radiology section of CPT®: ordered and reviewed  Tests in the medicine section of CPT®: reviewed and ordered  Discussion of test results with the performing providers: yes  Decide to obtain previous medical records or to obtain history from someone other than the patient: yes  Obtain history from someone other than the patient: yes  Review and summarize past medical records: yes  Discuss the patient with other providers: yes  Independent visualization of images, tracings, or specimens: yes    Risk of Complications, Morbidity, and/or Mortality  Presenting problems: moderate  Diagnostic procedures: moderate  Management options: moderate           Procedures    ED EKG interpretation:  Rhythm: normal sinus rhythm; and regular . Rate (approx.): 72; Axis: left axis deviation; P wave: normal; QRS interval: normal ; ST/T wave: non-specific changes; in  Lead: Diffusely; Other findings: abnormal ekg. This EKG was interpreted by Sasha Sen MD,ED Provider. PROGRESS NOTE:  Pt has been reexamined by Jake Stewart MD all available results have been reviewed with pt and any available family. Pt understands sx, dx, and tx in ED. Care plan has been outlined and questions have been answered. Pt and any available family understands and agrees to need for admission to hospital for further tx not available in ED. Pt is ready for admission. Written by Sasha Sen MD,  10:28 PM    11:00 PM  Patient expresses wish to leave the emergency department against medical advice. Adverse problems related to this decision including bodily harm, permanent disability and death have been discussed with the patient and/or family.   The patient and/or family do not appear intoxicated and appear to be capable of understanding and making a decision of such gravity. The patient and/or family express understanding of the possible adverse outcomes of their decision and still express the wish to leave against medical advice. The patient and/or family were given follow up and return instructions and the available laboratory tests and imaging studies were discussed. The patient and/or family were given the opportunity to ask questions. The patient and/or family agree to follow up with a physician of their choice as soon as possible or to return to the ER at any time to finish the work-up. Freeman Estrada

## 2019-02-06 NOTE — ED NOTES
59yo male Pt with PMH of coag disorder, GERD, sleep apnea presents to ED for SOB and dizziness, sent in from PCP, oriented to room and call bell, cardiac and continuous spO2 monitoring initiated, IV started, blood samples collected and sent to lab for analysis, EKG, plan of care reviewed, call bell in reach, side rails up x2, will continue to monitor.

## 2019-02-06 NOTE — ED NOTES
[facility] here to p/u. TRANSFER - OUT REPORT:    Verbal report given to Diane(name) on Gloria Ayala  being transferred to S(unit) for routine progression of care       Report consisted of patients Situation, Background, Assessment and   Recommendations(SBAR). Information from the following report(s) SBAR and ED Summary was reviewed with the receiving nurse. Lines:   Peripheral IV 02/06/19 Right Arm (Active)   Site Assessment Clean, dry, & intact 2/6/2019 12:57 PM   Phlebitis Assessment 0 2/6/2019 12:57 PM   Dressing Status Clean, dry, & intact 2/6/2019 12:57 PM   Dressing Type Transparent;Tape 2/6/2019 12:57 PM       Peripheral IV 02/06/19 Left Antecubital (Active)   Site Assessment Clean, dry, & intact 2/6/2019 12:57 PM   Phlebitis Assessment 0 2/6/2019 12:57 PM   Infiltration Assessment 0 2/6/2019 12:57 PM   Dressing Status Clean, dry, & intact 2/6/2019 12:57 PM   Dressing Type Transparent;Tape 2/6/2019 12:57 PM        Opportunity for questions and clarification was provided.       Patient transported with:   Monitor

## 2019-02-07 LAB
ABO + RH BLD: NORMAL
ALBUMIN SERPL-MCNC: 3.5 G/DL (ref 3.5–5)
ALBUMIN/GLOB SERPL: 1.3 {RATIO} (ref 1.1–2.2)
ALP SERPL-CCNC: 54 U/L (ref 45–117)
ALT SERPL-CCNC: 28 U/L (ref 12–78)
ANION GAP SERPL CALC-SCNC: 9 MMOL/L (ref 5–15)
AST SERPL-CCNC: 16 U/L (ref 15–37)
BASOPHILS # BLD: 0 K/UL (ref 0–0.1)
BASOPHILS NFR BLD: 0 % (ref 0–1)
BILIRUB SERPL-MCNC: 1.1 MG/DL (ref 0.2–1)
BLD PROD TYP BPU: NORMAL
BLD PROD TYP BPU: NORMAL
BLOOD GROUP ANTIBODIES SERPL: NORMAL
BLOOD GROUP ANTIBODIES SERPL: NORMAL
BPU ID: NORMAL
BPU ID: NORMAL
BUN SERPL-MCNC: 14 MG/DL (ref 6–20)
BUN/CREAT SERPL: 16 (ref 12–20)
CALCIUM SERPL-MCNC: 8.3 MG/DL (ref 8.5–10.1)
CHLORIDE SERPL-SCNC: 112 MMOL/L (ref 97–108)
CO2 SERPL-SCNC: 22 MMOL/L (ref 21–32)
CREAT SERPL-MCNC: 0.89 MG/DL (ref 0.7–1.3)
CROSSMATCH RESULT,%XM: NORMAL
CROSSMATCH RESULT,%XM: NORMAL
DAT POLY-SP REAG RBC QL: NORMAL
DIFFERENTIAL METHOD BLD: ABNORMAL
EOSINOPHIL # BLD: 0.3 K/UL (ref 0–0.4)
EOSINOPHIL NFR BLD: 6 % (ref 0–7)
ERYTHROCYTE [DISTWIDTH] IN BLOOD BY AUTOMATED COUNT: 19.3 % (ref 11.5–14.5)
FERRITIN SERPL-MCNC: 2 NG/ML (ref 26–388)
FOLATE SERPL-MCNC: 28.9 NG/ML (ref 5–21)
GLOBULIN SER CALC-MCNC: 2.7 G/DL (ref 2–4)
GLUCOSE SERPL-MCNC: 87 MG/DL (ref 65–100)
HAPTOGLOB SERPL-MCNC: 120 MG/DL (ref 30–200)
HCT VFR BLD AUTO: 24.6 % (ref 36.6–50.3)
HCT VFR BLD AUTO: 27.2 % (ref 36.6–50.3)
HEMOCCULT STL QL: NEGATIVE
HGB BLD-MCNC: 6.9 G/DL (ref 12.1–17)
HGB BLD-MCNC: 7.8 G/DL (ref 12.1–17)
IMM GRANULOCYTES # BLD AUTO: 0 K/UL
IMM GRANULOCYTES NFR BLD AUTO: 0 %
IRON SATN MFR SERPL: 29 % (ref 20–50)
IRON SERPL-MCNC: 125 UG/DL (ref 35–150)
LDH SERPL L TO P-CCNC: 114 U/L (ref 85–241)
LYMPHOCYTES # BLD: 0.8 K/UL (ref 0.8–3.5)
LYMPHOCYTES NFR BLD: 17 % (ref 12–49)
MCH RBC QN AUTO: 21.3 PG (ref 26–34)
MCHC RBC AUTO-ENTMCNC: 28 G/DL (ref 30–36.5)
MCV RBC AUTO: 75.9 FL (ref 80–99)
MONOCYTES # BLD: 0.5 K/UL (ref 0–1)
MONOCYTES NFR BLD: 11 % (ref 5–13)
NEUTS SEG # BLD: 3 K/UL (ref 1.8–8)
NEUTS SEG NFR BLD: 66 % (ref 32–75)
NRBC # BLD: 0 K/UL (ref 0–0.01)
NRBC BLD-RTO: 0 PER 100 WBC
PERIPHERAL SMEAR,PSM: NORMAL
PLATELET # BLD AUTO: 207 K/UL (ref 150–400)
PMV BLD AUTO: 10.4 FL (ref 8.9–12.9)
POTASSIUM SERPL-SCNC: 3.9 MMOL/L (ref 3.5–5.1)
PROT SERPL-MCNC: 6.2 G/DL (ref 6.4–8.2)
RBC # BLD AUTO: 3.24 M/UL (ref 4.1–5.7)
RBC MORPH BLD: ABNORMAL
RETICS # AUTO: 0.08 M/UL (ref 0.03–0.1)
RETICS/RBC NFR AUTO: 2.5 % (ref 0.7–2.1)
SODIUM SERPL-SCNC: 143 MMOL/L (ref 136–145)
SPECIMEN EXP DATE BLD: NORMAL
STATUS OF UNIT,%ST: NORMAL
STATUS OF UNIT,%ST: NORMAL
TIBC SERPL-MCNC: 433 UG/DL (ref 250–450)
TROPONIN I SERPL-MCNC: <0.05 NG/ML
TSH SERPL DL<=0.05 MIU/L-ACNC: 2.74 UIU/ML (ref 0.36–3.74)
UNIT DIVISION, %UDIV: 0
UNIT DIVISION, %UDIV: 0
VIT B12 SERPL-MCNC: 950 PG/ML (ref 193–986)
WBC # BLD AUTO: 4.6 K/UL (ref 4.1–11.1)

## 2019-02-07 PROCEDURE — 84443 ASSAY THYROID STIM HORMONE: CPT

## 2019-02-07 PROCEDURE — 82272 OCCULT BLD FECES 1-3 TESTS: CPT

## 2019-02-07 PROCEDURE — 74011000250 HC RX REV CODE- 250: Performed by: FAMILY MEDICINE

## 2019-02-07 PROCEDURE — 85045 AUTOMATED RETICULOCYTE COUNT: CPT

## 2019-02-07 PROCEDURE — 83540 ASSAY OF IRON: CPT

## 2019-02-07 PROCEDURE — 85025 COMPLETE CBC W/AUTO DIFF WBC: CPT

## 2019-02-07 PROCEDURE — C9113 INJ PANTOPRAZOLE SODIUM, VIA: HCPCS | Performed by: FAMILY MEDICINE

## 2019-02-07 PROCEDURE — 99218 HC RM OBSERVATION: CPT

## 2019-02-07 PROCEDURE — 84484 ASSAY OF TROPONIN QUANT: CPT

## 2019-02-07 PROCEDURE — 82607 VITAMIN B-12: CPT

## 2019-02-07 PROCEDURE — 96376 TX/PRO/DX INJ SAME DRUG ADON: CPT

## 2019-02-07 PROCEDURE — 77030032490 HC SLV COMPR SCD KNE COVD -B

## 2019-02-07 PROCEDURE — 80053 COMPREHEN METABOLIC PANEL: CPT

## 2019-02-07 PROCEDURE — 74011000250 HC RX REV CODE- 250: Performed by: NURSE PRACTITIONER

## 2019-02-07 PROCEDURE — 85018 HEMOGLOBIN: CPT

## 2019-02-07 PROCEDURE — 82746 ASSAY OF FOLIC ACID SERUM: CPT

## 2019-02-07 PROCEDURE — 36415 COLL VENOUS BLD VENIPUNCTURE: CPT

## 2019-02-07 PROCEDURE — 74011250636 HC RX REV CODE- 250/636: Performed by: FAMILY MEDICINE

## 2019-02-07 PROCEDURE — 74011250637 HC RX REV CODE- 250/637: Performed by: FAMILY MEDICINE

## 2019-02-07 PROCEDURE — 83615 LACTATE (LD) (LDH) ENZYME: CPT

## 2019-02-07 PROCEDURE — 65270000029 HC RM PRIVATE

## 2019-02-07 PROCEDURE — 82728 ASSAY OF FERRITIN: CPT

## 2019-02-07 PROCEDURE — 83010 ASSAY OF HAPTOGLOBIN QUANT: CPT

## 2019-02-07 PROCEDURE — 86880 COOMBS TEST DIRECT: CPT

## 2019-02-07 RX ADMIN — SODIUM CHLORIDE 40 MG: 9 INJECTION, SOLUTION INTRAMUSCULAR; INTRAVENOUS; SUBCUTANEOUS at 21:18

## 2019-02-07 RX ADMIN — POLYETHYLENE GLYCOL 3350, SODIUM SULFATE ANHYDROUS, SODIUM BICARBONATE, SODIUM CHLORIDE, POTASSIUM CHLORIDE 4000 ML: 236; 22.74; 6.74; 5.86; 2.97 POWDER, FOR SOLUTION ORAL at 16:27

## 2019-02-07 RX ADMIN — Medication 400 MG: at 08:50

## 2019-02-07 RX ADMIN — SODIUM CHLORIDE 40 MG: 9 INJECTION, SOLUTION INTRAMUSCULAR; INTRAVENOUS; SUBCUTANEOUS at 08:50

## 2019-02-07 RX ADMIN — Medication 10 ML: at 06:35

## 2019-02-07 RX ADMIN — Medication 10 ML: at 14:00

## 2019-02-07 RX ADMIN — Medication 10 ML: at 21:18

## 2019-02-07 NOTE — PROGRESS NOTES
Reason for Admission: The patient presented with low hemoglobin and anemia. RRAT Score:  2                   Plan for utilizing home health:  Anticipate none, patient endorses being independent, no skilled nursing needs identified                        Likelihood of Readmission: Low                          Transition of Care Plan: Home    The CM met with patient at bedside in order to introduce the role of CM and assess for patient needs. The patient verified demographics and insurance information- the patient lives at home with his wife. The patient endorses being independent with ADLs and mobility prior to hospitalization- patient utilizes CPAP machine at home, no other DME identified. The patient endorses his spouse will provide transportation home. The patient denied difficulty affording or accessing medications prior to hospitalization. The patient denied having concerns for transition home- anticipate patient will discharge home following GI consultation and stable hemoglobin. Terrilee Spatz, MSW     Care Management Interventions  PCP Verified by CM: Yes(Patient verified PCP as Dr. Harjeet Sims)  Mode of Transport at Discharge:  Other (see comment)(Family will transport home upon discharge)  Transition of Care Consult (CM Consult): Discharge Planning  MyChart Signup: No  Discharge Durable Medical Equipment: No  Health Maintenance Reviewed: Yes  Physical Therapy Consult: No  Occupational Therapy Consult: No  Speech Therapy Consult: No  Current Support Network: Lives with Spouse, Own Home  Confirm Follow Up Transport: Family  Plan discussed with Pt/Family/Caregiver: Yes  Smock Resource Information Provided?: No  Discharge Location  Discharge Placement: Home

## 2019-02-07 NOTE — PROGRESS NOTES
Problem: Falls - Risk of  Goal: *Absence of Falls  Document Samira Fall Risk and appropriate interventions in the flowsheet.   Outcome: Progressing Towards Goal  Fall Risk Interventions:            Medication Interventions: Evaluate medications/consider consulting pharmacy, Patient to call before getting OOB, Teach patient to arise slowly, Assess postural VS orthostatic hypotension

## 2019-02-07 NOTE — PROGRESS NOTES
Problem: Falls - Risk of  Goal: *Absence of Falls  Document Samira Fall Risk and appropriate interventions in the flowsheet. Outcome: Progressing Towards Goal  Fall Risk Interventions:            Medication Interventions: Patient to call before getting OOB, Teach patient to arise slowly                  Problem: Pressure Injury - Risk of  Goal: *Prevention of pressure injury  Document Anders Scale and appropriate interventions in the flowsheet.   Outcome: Progressing Towards Goal  Pressure Injury Interventions:

## 2019-02-07 NOTE — CONSULTS
[address]  [address] [place], [address]  [phone]                     GI CONSULTATION NOTE  [facility]  Work Cell: [phone]      NAME:  Jaret Alvarado   :   1960   MRN:   [id number]       Referring Provider: Dr. Garcia Nurse Date: 2019     Chief Complaint: SOB    History of Present Illness:  Patient is a 63 y.o. who is seen in consultation at the request of Dr. Luan Sánchez for GI bleed. Mr. Namita Real has a PMH as detailed below. He presented to the hospital with SOB. This has been present for some time and progressively worsening. He saw PCP whom checked labs. Hemoglobin found to be 6 and he was referred to hospital for transfusion. Overall, he is in good health. He denies any n/v, dysphagia, abdominal pain, change in bowel habits or melena. He reports intermittent history of significant hematochezia which he attributed to his hemorrhoids. Last episode of bleeding was 2 weeks ago. During this time, he will have 2 bloody BMs daily for a week. Blood is bright red. He will start using OTC hemorrhoidal preparations and bleeding eventually resolves. He has personal history of colon polyps, although none were seen on last exam. He has family history of colon cancer in maternal grandfather. He denies any ASA or NSAID use. He is not on any anticoagulants. He has lost 10 lbs but attributes this to dietary changes. Hgb 6.9 after receiving 2 units PRBCs.        PMH:  Past Medical History:   Diagnosis Date    Chronic pain     Coagulation disorder (HCC)     HX ANEMIA    GERD (gastroesophageal reflux disease)     OCCASIONAL    Sleep apnea     CPAP       PSH:  Past Surgical History:   Procedure Laterality Date    HX COLONOSCOPY      HX ENDOSCOPY      HX HEENT      WISDOM TEETH    HX HIP REPLACEMENT Right 2016    total       Allergies:  No Known Allergies    Home Medications:  Prior to Admission Medications   Prescriptions Last Dose Informant Patient Reported? Taking? B.infantis-B.ani-B.long-B.bifi (PROBIOTIC 4X) 10-15 mg TbEC 2019 at AM  Yes Yes   Sig: Take  by mouth daily. Cetirizine 10 mg cap   Yes Yes   Sig: Take  by mouth. MULTIVITAMIN PO   Yes Yes   Sig: Take  by mouth daily. OTHER   Yes Yes   calcium carbonate (CALTREX) 600 mg calcium (1,500 mg) tablet 2019 at Unknown time  Yes Yes   Sig: Take 600 mg by mouth two (2) times a day. cholecalciferol (VITAMIN D3) 1,000 unit cap   Yes Yes   Sig: Take 6,000 Units by mouth daily. co-enzyme Q-10 (COQ-10) 100 mg capsule   Yes Yes   Sig: Take 100 mg by mouth daily. ergocalciferol (VITAMIN D2) 50,000 unit capsule   Yes Yes   Sig: Take 50,000 Units by mouth.   loratadine (CLARITIN) 10 mg tablet 2019 at PM  Yes Yes   Sig: Take 10 mg by mouth daily as needed for Allergies. magnesium oxide (MAG-OX) 400 mg tablet 2019 at PM  Yes Yes   Sig: Take 400 mg by mouth daily. milk thistle 500 mg cap   Yes Yes   Sig: Take  by mouth. omega-3 acid ethyl esters (LOVAZA) 1 gram capsule   Yes Yes   Sig: Take 1 g by mouth two (2) times a day. phytonadione, vitamin K1, (MEPHYTON) 5 mg tablet   Yes Yes   Sig: Take  by mouth now. turmeric (CURCUMIN)   Yes Yes   Si mg by Does Not Apply route.       Facility-Administered Medications: None       Hospital Medications:  Current Facility-Administered Medications   Medication Dose Route Frequency    0.9% sodium chloride infusion 250 mL  250 mL IntraVENous PRN    0.9% sodium chloride infusion 250 mL  250 mL IntraVENous PRN    sodium chloride (NS) flush 5-40 mL  5-40 mL IntraVENous Q8H    sodium chloride (NS) flush 5-40 mL  5-40 mL IntraVENous PRN    magnesium oxide (MAG-OX) tablet 400 mg  400 mg Oral DAILY    sodium chloride (NS) flush 5-40 mL  5-40 mL IntraVENous Q8H    sodium chloride (NS) flush 5-40 mL  5-40 mL IntraVENous PRN    pantoprazole (PROTONIX) 40 mg in sodium chloride 0.9% 10 mL injection  40 mg IntraVENous Q12H    0.9% sodium chloride infusion  75 mL/hr IntraVENous CONTINUOUS       Social History:  Social History     Tobacco Use    Smoking status: Never Smoker    Smokeless tobacco: Never Used   Substance Use Topics    Alcohol use: Yes     Comment: occassionally       Family History:  Family History   Problem Relation Age of Onset    Heart Disease Mother         CHF    Hypertension Mother     Lung Disease Father         COPD    Alcohol abuse Father     Heart Disease Sister         VALVE REPLACEMENT    Heart Attack Maternal Grandmother     Cancer Maternal Grandfather         COLON    Heart Attack Paternal Grandmother     Heart Attack Paternal Grandfather     Anesth Problems Neg Hx        Review of Systems:    Constitutional: negative fever, negative chills, negative weight loss  Eyes:   negative visual changes  ENT:   negative sore throat, tongue or lip swelling  Respiratory:  negative cough, negative dyspnea  Cards:  negative for chest pain, palpitations, lower extremity edema  GI:   See HPI  :  negative for frequency, dysuria  Integument:  negative for rash and pruritus  Heme:  negative for easy bruising and gum/nose bleeding  Musculoskel: negative for myalgias, back pain and muscle weakness  Neuro: negative for headaches, dizziness, vertigo  Psych:  negative for feelings of anxiety, depression      Objective:     Patient Vitals for the past 8 hrs:   BP Temp Pulse Resp SpO2   02/07/19 0800 -- -- -- -- 97 %   02/07/19 0600 125/63 98.2 °F (36.8 °C) 68 16 98 %     No intake/output data recorded. 02/05 1901 - 02/07 0700  In: 640.3 [P.O.:300]  Out: 200 [Urine:200]      PHYSICAL EXAM:  General: WD, WN. Alert, cooperative, no acute distress.    HEENT: NC, Atraumatic. PERRLA, EOMI. Anicteric sclerae. Lungs:  CTA Bilaterally. No Wheezing/Rhonchi/Rales.   Heart:  Regular rate and rhythm, No murmur, No Rubs, No Gallops  Abdomen: Soft, non-distended, non-tender.  +Bowel sounds, no HSM  Extremities: No c/c/e  Neurologic:  Alert and oriented X 3. No acute neurological distress. Psych:   Good insight. Not anxious nor agitated. Data Review     Recent Labs     02/07/19  0324 02/06/19  1248   WBC 4.6 4.2   HGB 6.9* 6.0*   HCT 24.6* 21.5*    216     Recent Labs     02/07/19  0324 02/06/19  1248    140   K 3.9 3.9   * 103   CO2 22 25   BUN 14 16   CREA 0.89 1.03   GLU 87 92   CA 8.3* 8.7     Recent Labs     02/07/19  0324 02/06/19  1248 02/05/19 2017   SGOT 16 19 16   AP 54 62 64   TP 6.2* 6.7 7.2   ALB 3.5 3.7 4.0   GLOB 2.7 3.0 3.2   LPSE  --   --  127     Recent Labs     02/05/19 2017   INR 1.0   PTP 10.3   APTT 24.8        Imaging studies reviewed        Assessment:   1. Anemia - with history of intermittent hematochezia which he attributes to hemorrhoids. Colonoscopy 4/2018 w/ internal and external hemorrhoids. While may be all due to hemorrhoids, would r/o potential upper GI source and re-evaluate colon for an lesions, etc.    Patient Active Problem List   Diagnosis Code    Degenerative joint disease (DJD) of hip M16.9    Sleep apnea G47.30    GERD (gastroesophageal reflux disease) K21.9    Coagulation disorder ([facility] [address] D68.9    Chronic pain G89.29    Severe obesity (BMI 35.0-39. 9) E66.01    Anemia D64.9              Plan:   -Clear liquids as tolerated  -Supportive management per primary team  -Bowel prep this afternoon  -NPO after midnight  -Plan for EGD/colonoscopy tomorrow  -Further recommendations to follow  -Monitor H&H, transfuse as needed  -Discussed with Dr. Mathew Tafoya  -Will follow along with you  -Thank you kindly for allowing us to participate in the care of this patient

## 2019-02-07 NOTE — CONSULTS
[address] Name:  Priscila Hay 
MR#:  [id number] :  1960 ACCOUNT #:  [de-identified] DATE OF SERVICE:  2019 CHIEF COMPLAINT:  Shortness of breath. HISTORY OF PRESENT ILLNESS:  The patient is a 60-year-old 
gentleman with past medical history of hemorrhoid and 
environmental allergies, who presents to the hospital with 
the above-mentioned symptoms. The patient reports that for 
the past few days, he has been getting increasing shortness 
of breath, especially when he exerts himself. The patient 
reports the shortness of breath is mild and not accompanied 
by any other symptoms. The patient reports that yesterday 
he got concerned and decided to go to his PCP. The patient 
had some blood work done, was found to have low hemoglobin 
and was sent to the ER at [facility], but had a long wait so 
decided to leave. The patient came back today _____01:10 
because his wife noticed that he was pale in color, was 
found to have hemoglobin of 6 and was requested to be 
admitted into the hospitalist service. The patient also 
reports that he has been having some fatigue for the last 
few days. The patient denies any chest pain associated with 
the symptoms. Denies any headache, blurry vision, sore 
throat, trouble swallowing, trouble with speech, any nausea 
or vomiting, abdominal pain, constipation, diarrhea, urinary 
symptoms, focal or generalized neurological weakness, recent 
travel, sick contacts, falls, injuries, hematemesis, melena, 
hemoptysis, hematuria, or any other concerns or problems. The patient reports that he has history of hemorrhoids. About 2 weeks back, he had \"some problem with hemorrhoids,\" 
but it went away after using Preparation H. The patient 
denies any other complaints or problems. The patient was 
transferred from ______ for further management and 
evaluation. PAST MEDICAL HISTORY:  See above. HOME MEDICATIONS:  Currently, the patient is on turmeric, 
magnesium oxide 400 mg daily. vitamin D2, vitamin K, milk of 
[place], Coenzyme Q10, omega-3 fatty acids(Lovaza), calcium 
carbonate, multivitamin, probiotic, cetirizine, and 
cholecalciferol. SOCIAL HISTORY:  Denies tobacco abuse. Occasional alcohol. Denies IV drug abuse. FAMILY HISTORY:  Mother has a history of heart disease and 
CHF, hypertension. Father has a history of lung disease. Father has history of alcohol abuse. A sister has a history 
of heart valve replacement. Grandmother has a history of 
heart attack. ALLERGIES:  NO KNOWN DRUG ALLERGIES. REVIEW OF SYSTEMS:   All systems were reviewed and found to 
be essentially negative except for the symptoms as mentioned 
above. PHYSICAL EXAMINATION: 
VITAL SIGNS:  Temperature 98.2, pulse 74, respiratory rate 10, blood pressure 119/71, pulse ox 100% on room air. GENERAL:  Alert x3, awake, mildly distressed, pleasant male, 
appears to be stated age. HEENT:  Pupils equal and reactive to light. Dry mucous 
membranes. Tympanic membranes are clear. NECK:  Supple. CHEST:  Clear to auscultation bilaterally. COR:  S1, S2 heard. ABDOMEN:  Soft, nontender, nondistended. Bowel sounds are 
physiological. 
EXTREMITIES:  No clubbing, no cyanosis, no edema. NEURO/PSYCH:  Pleasant mood and affect. Cranial nerves II 
through XII grossly intact. Sensory grossly within normal 
limits. DTRs 2+ x4. Strength 5/5. SKIN:  Warm. LABORATORY DATA:  White count 4.2, hemoglobin 6.0, 
hematocrit 21.5, platelets 918. Sodium 140, potassium 3.9, 
chloride 103, bicarb 25, anion gap 12, glucose 92, BUN 16, 
creatinine 1.02, calcium 8.7, bili total 0.4. ALT 36, AST 
19, alk phos 62, glucose 92. X-ray of the chest shows no acute cardiopulmonary disease. EKG shows normal sinus rhythm and no acute ST elevation. ASSESSMENT AND PLAN: 
1. Acute symptomatic anemia, unclear etiology. The patient will be admitted on a telemetry bed. We will type and cross 2 units PRBC that were ordered in the ER as part some of the 
consent was lost, so I spoke with the patient and provided 
the details and the risks associated with the transfusion. The patient accepts the risk and a consent was signed. We 
will get Gastroenterology involved. We will start the 
patient on Protonix b.i.d. The ER physician tried to do 
_____ Hemoccult on the patient, but could not because there 
was no stool in the rectal vault. We will send stool for 
occult blood. Keep on clear liquid diet for now. Monitor H 
and H and further intervention per hospital course. We will 
also get some basic anemia labs including iron panel, 
haptoglobin, LDH, Crissy, and ferritin levels. May consider 
getting a Hematology consult if the symptoms persists. Further intervention per hospital course. Continue to 
monitor. 2.  History of hemorrhoids, currently stable. Continue to 
monitor. 3.  Gastrointestinal and deep vein thrombosis prophylaxis. The patient will be on sequential compression devices. Nancy Garcia MD 
 
 
[id number] 
D:  02/06/2019 17:14 
T:  02/06/2019 19:21JOB #:  [id number]

## 2019-02-07 NOTE — PROGRESS NOTES
Hospitalist Progress Note  Lise Main MD  Answering service: [phone] OR [phone] from in house phone        Date of Service:  2019  NAME:  Padmini Anaya  :  1960  MRN:  [id number]      Admission Summary:   60-year-old gentleman with past medical history of hemorrhoid and  environmental allergies admitted due to symptomatic anemia      Interval history / Subjective:   F/u for anemia    Patient states that 2 weeks ago he had bleeding per rectum when he had a bowel movement which lasted for 1 week. He attributed it to hemorrhoids as this happened him in the past.    Breathing better today. Denied any chest pain,LE edema and palpitations     Assessment & Plan:     #Acute blood loss anemia:  -patient said he had bleeding per rectum few days ago which he thought was due to hemorrhoids. -unclear what his baseline Hb is.   -s/p 2 U PRBCs yesterday  -Hb improved to 7.8.  -will monitor H and H Q 12 hr  -GI consulted - plan for  EGD and colonoscopy as he has microcytic anemia to rule out lesions/cancer  -Labs reviewed - haptoglobin,LDH,TSH nl. Iron profile done this morning - this is not accurate as he had blood transfusion. History of hemorrhoids s/p banding last year. Code status: full  DVT prophylaxis: SCD    Care Plan discussed with: patient,GI team and nurse  Disposition: home when stable     Hospital Problems  Date Reviewed: 2019          Codes Class Noted POA    * (Principal) Anemia ICD-10-CM: D64.9  ICD-9-CM: 285.9  2019 Unknown                Review of Systems:   As per HPI      Vital Signs:    Last 24hrs VS reviewed since prior progress note.  Most recent are:  Visit Vitals  /65 (BP 1 Location: Right arm, BP Patient Position: At rest)   Pulse 69   Temp 98.2 °F (36.8 °C)   Resp 17   Ht 5' 10\" (1.778 m)   Wt 107.8 kg (237 lb 11.2 oz)   SpO2 98%   BMI 34.11 kg/m²         Intake/Output Summary (Last 24 hours) at 2/7/2019 1835  Last data filed at 2/6/2019 2251  Gross per 24 hour   Intake 640.3 ml   Output 200 ml   Net 440.3 ml        Physical Examination:             Constitutional:  No acute distress, cooperative, pleasant    ENT:  Oral mucous moist, oropharynx benign. Neck supple,    Resp:  CTA bilaterally. No wheezing/rhonchi/rales. No accessory muscle use   CV:  Regular rhythm, normal rate, no murmurs, gallops, rubs    GI:  Soft, non distended, non tender. normoactive bowel sounds, no hepatosplenomegaly     Musculoskeletal:  No edema, warm, 2+ pulses throughout    Neurologic:  Moves all extremities. AAOx3, CN II-XII reviewed     Psych:  Good insight, Not anxious nor agitated. Data Review:    Review and/or order of clinical lab test  Review and/or order of tests in the radiology section of CPT  Review and/or order of tests in the medicine section of CPT      Labs:     Recent Labs     02/07/19  1415 02/07/19  0324 02/06/19  1248   WBC  --  4.6 4.2   HGB 7.8* 6.9* 6.0*   HCT 27.2* 24.6* 21.5*   PLT  --  207 216     Recent Labs     02/07/19  0324 02/06/19  1248 02/05/19 2017    140 140   K 3.9 3.9 3.8   * 103 109*   CO2 22 25 24   BUN 14 16 17   CREA 0.89 1.03 0.99   GLU 87 92 96   CA 8.3* 8.7 8.6   MG  --   --  2.4     Recent Labs     02/07/19  0324 02/06/19  1248 02/05/19 2017   SGOT 16 19 16   ALT 28 36 29   AP 54 62 64   TBILI 1.1* 0.4 0.3   TP 6.2* 6.7 7.2   ALB 3.5 3.7 4.0   GLOB 2.7 3.0 3.2   LPSE  --   --  127     Recent Labs     02/05/19 2017   INR 1.0   PTP 10.3   APTT 24.8      Recent Labs     02/07/19  0324   TIBC 433   PSAT 29   FERR 2*      Lab Results   Component Value Date/Time    Folate 28.9 (H) 02/07/2019 03:24 AM      No results for input(s): PH, PCO2, PO2 in the last 72 hours.   Recent Labs     02/07/19  0324 02/06/19  1837 02/05/19 2017   CPK  --   --  100   CKNDX  --   --  1.1   TROIQ <0.05 <0.05 <0.05     No results found for: CHOL, CHOLX, CHLST, CHOLV, HDL, LDL, LDLC, DLDLP, Dashawn Grimes, [facility], [facility]  Lab Results   Component Value Date/Time    Glucose (POC) 105 (H) 01/24/2017 07:02 AM     Lab Results   Component Value Date/Time    Color YELLOW/STRAW 01/05/2017 08:45 AM    Appearance CLEAR 01/05/2017 08:45 AM    Specific gravity 1.018 01/05/2017 08:45 AM    pH (UA) 7.0 01/05/2017 08:45 AM    Protein NEGATIVE  01/05/2017 08:45 AM    Glucose NEGATIVE  01/05/2017 08:45 AM    Ketone NEGATIVE  01/05/2017 08:45 AM    Bilirubin NEGATIVE  01/05/2017 08:45 AM    Urobilinogen 0.2 01/05/2017 08:45 AM    Nitrites NEGATIVE  01/05/2017 08:45 AM    Leukocyte Esterase NEGATIVE  01/05/2017 08:45 AM    Epithelial cells FEW 01/05/2017 08:45 AM    Bacteria NEGATIVE  01/05/2017 08:45 AM    WBC 0-4 01/05/2017 08:45 AM    RBC 0-5 01/05/2017 08:45 AM         Medications Reviewed:     Current Facility-Administered Medications   Medication Dose Route Frequency    0.9% sodium chloride infusion 250 mL  250 mL IntraVENous PRN    0.9% sodium chloride infusion 250 mL  250 mL IntraVENous PRN    sodium chloride (NS) flush 5-40 mL  5-40 mL IntraVENous Q8H    sodium chloride (NS) flush 5-40 mL  5-40 mL IntraVENous PRN    magnesium oxide (MAG-OX) tablet 400 mg  400 mg Oral DAILY    sodium chloride (NS) flush 5-40 mL  5-40 mL IntraVENous Q8H    sodium chloride (NS) flush 5-40 mL  5-40 mL IntraVENous PRN    pantoprazole (PROTONIX) 40 mg in sodium chloride 0.9% 10 mL injection  40 mg IntraVENous Q12H    0.9% sodium chloride infusion  75 mL/hr IntraVENous CONTINUOUS     ______________________________________________________________________  EXPECTED LENGTH OF STAY: - - -  ACTUAL LENGTH OF STAY:          0                 Jonas Welch MD

## 2019-02-07 NOTE — PROGRESS NOTES
Problem: Falls - Risk of  Goal: *Absence of Falls  Document Samira Fall Risk and appropriate interventions in the flowsheet. Outcome: Progressing Towards Goal  Fall Risk Interventions:            Medication Interventions: Assess postural VS orthostatic hypotension, Evaluate medications/consider consulting pharmacy, Patient to call before getting OOB, Teach patient to arise slowly                  Problem: Pressure Injury - Risk of  Goal: *Prevention of pressure injury  Document Anders Scale and appropriate interventions in the flowsheet.   Outcome: Progressing Towards Goal  Pressure Injury Interventions:

## 2019-02-07 NOTE — PROGRESS NOTES
Problem: Falls - Risk of  Goal: *Absence of Falls  Document Samira Fall Risk and appropriate interventions in the flowsheet.   Outcome: Progressing Towards Goal  Fall Risk Interventions:

## 2019-02-07 NOTE — PROGRESS NOTES
Bedside and Verbal shift change report given to FRANKIE blankenship (oncoming nurse) by Ramila Hooker RN (offgoing nurse). Report included the following information SBAR, Kardex, Procedure Summary, Intake/Output, MAR, Recent Results and Cardiac Rhythm NSR.

## 2019-02-08 ENCOUNTER — ANESTHESIA EVENT (OUTPATIENT)
Dept: ENDOSCOPY | Age: 59
End: 2019-02-08
Payer: COMMERCIAL

## 2019-02-08 ENCOUNTER — ANESTHESIA (OUTPATIENT)
Dept: ENDOSCOPY | Age: 59
End: 2019-02-08
Payer: COMMERCIAL

## 2019-02-08 VITALS
WEIGHT: 235.7 LBS | HEIGHT: 70 IN | TEMPERATURE: 97.2 F | BODY MASS INDEX: 33.74 KG/M2 | SYSTOLIC BLOOD PRESSURE: 114 MMHG | OXYGEN SATURATION: 100 % | HEART RATE: 90 BPM | RESPIRATION RATE: 11 BRPM | DIASTOLIC BLOOD PRESSURE: 62 MMHG

## 2019-02-08 LAB
BASOPHILS # BLD: 0 K/UL (ref 0–0.1)
BASOPHILS NFR BLD: 1 % (ref 0–1)
DIFFERENTIAL METHOD BLD: ABNORMAL
EOSINOPHIL # BLD: 0.1 K/UL (ref 0–0.4)
EOSINOPHIL NFR BLD: 3 % (ref 0–7)
ERYTHROCYTE [DISTWIDTH] IN BLOOD BY AUTOMATED COUNT: 19.9 % (ref 11.5–14.5)
HCT VFR BLD AUTO: 28.4 % (ref 36.6–50.3)
HGB BLD-MCNC: 7.9 G/DL (ref 12.1–17)
IMM GRANULOCYTES # BLD AUTO: 0 K/UL (ref 0–0.04)
IMM GRANULOCYTES NFR BLD AUTO: 1 % (ref 0–0.5)
LYMPHOCYTES # BLD: 0.7 K/UL (ref 0.8–3.5)
LYMPHOCYTES NFR BLD: 16 % (ref 12–49)
MCH RBC QN AUTO: 21.2 PG (ref 26–34)
MCHC RBC AUTO-ENTMCNC: 27.8 G/DL (ref 30–36.5)
MCV RBC AUTO: 76.1 FL (ref 80–99)
MONOCYTES # BLD: 0.5 K/UL (ref 0–1)
MONOCYTES NFR BLD: 11 % (ref 5–13)
NEUTS SEG # BLD: 3.1 K/UL (ref 1.8–8)
NEUTS SEG NFR BLD: 68 % (ref 32–75)
NRBC # BLD: 0 K/UL (ref 0–0.01)
NRBC BLD-RTO: 0 PER 100 WBC
PLATELET # BLD AUTO: 198 K/UL (ref 150–400)
PMV BLD AUTO: 9.3 FL (ref 8.9–12.9)
RBC # BLD AUTO: 3.73 M/UL (ref 4.1–5.7)
RBC MORPH BLD: ABNORMAL
RBC MORPH BLD: ABNORMAL
WBC # BLD AUTO: 4.4 K/UL (ref 4.1–11.1)

## 2019-02-08 PROCEDURE — 74011250636 HC RX REV CODE- 250/636: Performed by: FAMILY MEDICINE

## 2019-02-08 PROCEDURE — 96376 TX/PRO/DX INJ SAME DRUG ADON: CPT

## 2019-02-08 PROCEDURE — C9113 INJ PANTOPRAZOLE SODIUM, VIA: HCPCS | Performed by: FAMILY MEDICINE

## 2019-02-08 PROCEDURE — 99218 HC RM OBSERVATION: CPT

## 2019-02-08 PROCEDURE — 74011000250 HC RX REV CODE- 250

## 2019-02-08 PROCEDURE — 88305 TISSUE EXAM BY PATHOLOGIST: CPT

## 2019-02-08 PROCEDURE — 74011250636 HC RX REV CODE- 250/636

## 2019-02-08 PROCEDURE — 77030009426 HC FCPS BIOP ENDOSC BSC -B: Performed by: INTERNAL MEDICINE

## 2019-02-08 PROCEDURE — 76040000007: Performed by: INTERNAL MEDICINE

## 2019-02-08 PROCEDURE — 85025 COMPLETE CBC W/AUTO DIFF WBC: CPT

## 2019-02-08 PROCEDURE — 76060000032 HC ANESTHESIA 0.5 TO 1 HR: Performed by: INTERNAL MEDICINE

## 2019-02-08 PROCEDURE — 74011000250 HC RX REV CODE- 250: Performed by: FAMILY MEDICINE

## 2019-02-08 PROCEDURE — 36415 COLL VENOUS BLD VENIPUNCTURE: CPT

## 2019-02-08 PROCEDURE — 77030027957 HC TBNG IRR ENDOGTR BUSS -B: Performed by: INTERNAL MEDICINE

## 2019-02-08 RX ORDER — GLYCOPYRROLATE 0.2 MG/ML
INJECTION INTRAMUSCULAR; INTRAVENOUS AS NEEDED
Status: DISCONTINUED | OUTPATIENT
Start: 2019-02-08 | End: 2019-02-08 | Stop reason: HOSPADM

## 2019-02-08 RX ORDER — SODIUM CHLORIDE 0.9 % (FLUSH) 0.9 %
5-40 SYRINGE (ML) INJECTION EVERY 8 HOURS
Status: DISCONTINUED | OUTPATIENT
Start: 2019-02-08 | End: 2019-02-08 | Stop reason: HOSPADM

## 2019-02-08 RX ORDER — DEXTROMETHORPHAN/PSEUDOEPHED 2.5-7.5/.8
1.2 DROPS ORAL
Status: DISCONTINUED | OUTPATIENT
Start: 2019-02-08 | End: 2019-02-08 | Stop reason: HOSPADM

## 2019-02-08 RX ORDER — SODIUM CHLORIDE 9 MG/ML
50 INJECTION, SOLUTION INTRAVENOUS CONTINUOUS
Status: DISCONTINUED | OUTPATIENT
Start: 2019-02-08 | End: 2019-02-08 | Stop reason: HOSPADM

## 2019-02-08 RX ORDER — PROPOFOL 10 MG/ML
INJECTION, EMULSION INTRAVENOUS AS NEEDED
Status: DISCONTINUED | OUTPATIENT
Start: 2019-02-08 | End: 2019-02-08 | Stop reason: HOSPADM

## 2019-02-08 RX ORDER — FENTANYL CITRATE 50 UG/ML
100 INJECTION, SOLUTION INTRAMUSCULAR; INTRAVENOUS
Status: DISCONTINUED | OUTPATIENT
Start: 2019-02-08 | End: 2019-02-08 | Stop reason: HOSPADM

## 2019-02-08 RX ORDER — SODIUM CHLORIDE 0.9 % (FLUSH) 0.9 %
5-40 SYRINGE (ML) INJECTION AS NEEDED
Status: DISCONTINUED | OUTPATIENT
Start: 2019-02-08 | End: 2019-02-08 | Stop reason: HOSPADM

## 2019-02-08 RX ORDER — LANOLIN ALCOHOL/MO/W.PET/CERES
325 CREAM (GRAM) TOPICAL
Qty: 30 TAB | Refills: 0 | Status: SHIPPED | OUTPATIENT
Start: 2019-02-08

## 2019-02-08 RX ORDER — MIDAZOLAM HYDROCHLORIDE 1 MG/ML
.25-1 INJECTION, SOLUTION INTRAMUSCULAR; INTRAVENOUS
Status: DISCONTINUED | OUTPATIENT
Start: 2019-02-08 | End: 2019-02-08 | Stop reason: HOSPADM

## 2019-02-08 RX ORDER — LANOLIN ALCOHOL/MO/W.PET/CERES
325 CREAM (GRAM) TOPICAL
Qty: 30 TAB | Refills: 0 | OUTPATIENT
Start: 2019-02-08 | End: 2019-02-08 | Stop reason: SDUPTHER

## 2019-02-08 RX ORDER — FLUMAZENIL 0.1 MG/ML
0.2 INJECTION INTRAVENOUS
Status: DISCONTINUED | OUTPATIENT
Start: 2019-02-08 | End: 2019-02-08 | Stop reason: HOSPADM

## 2019-02-08 RX ORDER — NALOXONE HYDROCHLORIDE 0.4 MG/ML
0.4 INJECTION, SOLUTION INTRAMUSCULAR; INTRAVENOUS; SUBCUTANEOUS
Status: DISCONTINUED | OUTPATIENT
Start: 2019-02-08 | End: 2019-02-08 | Stop reason: HOSPADM

## 2019-02-08 RX ORDER — EPINEPHRINE 0.1 MG/ML
1 INJECTION INTRACARDIAC; INTRAVENOUS
Status: DISCONTINUED | OUTPATIENT
Start: 2019-02-08 | End: 2019-02-08 | Stop reason: HOSPADM

## 2019-02-08 RX ORDER — ATROPINE SULFATE 0.1 MG/ML
0.5 INJECTION INTRAVENOUS
Status: DISCONTINUED | OUTPATIENT
Start: 2019-02-08 | End: 2019-02-08 | Stop reason: HOSPADM

## 2019-02-08 RX ORDER — SODIUM CHLORIDE 9 MG/ML
INJECTION, SOLUTION INTRAVENOUS
Status: DISCONTINUED | OUTPATIENT
Start: 2019-02-08 | End: 2019-02-08 | Stop reason: HOSPADM

## 2019-02-08 RX ORDER — LIDOCAINE HYDROCHLORIDE 20 MG/ML
INJECTION, SOLUTION INFILTRATION; PERINEURAL AS NEEDED
Status: DISCONTINUED | OUTPATIENT
Start: 2019-02-08 | End: 2019-02-08 | Stop reason: HOSPADM

## 2019-02-08 RX ADMIN — SODIUM CHLORIDE 40 MG: 9 INJECTION, SOLUTION INTRAMUSCULAR; INTRAVENOUS; SUBCUTANEOUS at 08:59

## 2019-02-08 RX ADMIN — PROPOFOL 50 MG: 10 INJECTION, EMULSION INTRAVENOUS at 17:32

## 2019-02-08 RX ADMIN — Medication 10 ML: at 06:22

## 2019-02-08 RX ADMIN — PROPOFOL 50 MG: 10 INJECTION, EMULSION INTRAVENOUS at 17:29

## 2019-02-08 RX ADMIN — GLYCOPYRROLATE 0.2 MG: 0.2 INJECTION INTRAMUSCULAR; INTRAVENOUS at 17:01

## 2019-02-08 RX ADMIN — SODIUM CHLORIDE: 9 INJECTION, SOLUTION INTRAVENOUS at 17:01

## 2019-02-08 RX ADMIN — PROPOFOL 50 MG: 10 INJECTION, EMULSION INTRAVENOUS at 17:15

## 2019-02-08 RX ADMIN — PROPOFOL 30 MG: 10 INJECTION, EMULSION INTRAVENOUS at 17:25

## 2019-02-08 RX ADMIN — PROPOFOL 50 MG: 10 INJECTION, EMULSION INTRAVENOUS at 17:12

## 2019-02-08 RX ADMIN — PROPOFOL 50 MG: 10 INJECTION, EMULSION INTRAVENOUS at 17:10

## 2019-02-08 RX ADMIN — PROPOFOL 50 MG: 10 INJECTION, EMULSION INTRAVENOUS at 17:17

## 2019-02-08 RX ADMIN — LIDOCAINE HYDROCHLORIDE 100 MG: 20 INJECTION, SOLUTION INFILTRATION; PERINEURAL at 17:03

## 2019-02-08 RX ADMIN — PROPOFOL 120 MG: 10 INJECTION, EMULSION INTRAVENOUS at 17:08

## 2019-02-08 RX ADMIN — PROPOFOL 50 MG: 10 INJECTION, EMULSION INTRAVENOUS at 17:36

## 2019-02-08 RX ADMIN — PROPOFOL 50 MG: 10 INJECTION, EMULSION INTRAVENOUS at 17:22

## 2019-02-08 NOTE — ANESTHESIA POSTPROCEDURE EVALUATION
Procedure(s): ESOPHAGOGASTRODUODENOSCOPY (EGD) COLONOSCOPY 
ESOPHAGOGASTRODUODENAL (EGD) BIOPSY. Anesthesia Post Evaluation Patient location during evaluation: PACU Patient participation: complete - patient participated Level of consciousness: awake and alert Pain management: adequate Airway patency: patent Anesthetic complications: no 
Cardiovascular status: acceptable Respiratory status: acceptable Hydration status: acceptable Comments: I have seen and evaluated the patient and is ready for discharge. Tanya Ellis MD 
 
Post anesthesia nausea and vomiting:  none Visit Vitals /54 Pulse 85 Temp 36.9 °C (98.5 °F) Resp 16 Ht 5' 10\" (1.778 m) Wt 106.9 kg (235 lb 11.2 oz) SpO2 97% BMI 33.82 kg/m²

## 2019-02-08 NOTE — PROGRESS NOTES
TRANSFER - IN REPORT:    Verbal report received from Ty RN(name) on Wilhelm Mcardle  being received from [facility](unit) for routine progression of care      Report consisted of patients Situation, Background, Assessment and   Recommendations(SBAR). Information from the following report(s) SBAR, Kardex and MAR was reviewed with the receiving nurse. Opportunity for questions and clarification was provided. Assessment completed upon patients arrival to unit and care assumed.

## 2019-02-08 NOTE — PROGRESS NOTES
Problem: Upper and Lower GI Bleed: Day 1  Goal: Activity/Safety  Outcome: Progressing Towards Goal  Pt up with one assist and able to preform ADLs w/o assistance will continue to monitor

## 2019-02-08 NOTE — PROGRESS NOTES
Problem: Falls - Risk of  Goal: *Absence of Falls  Document Samira Fall Risk and appropriate interventions in the flowsheet.   Outcome: Progressing Towards Goal  Fall Risk Interventions:  Mobility Interventions: Bed/chair exit alarm, Communicate number of staff needed for ambulation/transfer, Patient to call before getting OOB         Medication Interventions: Evaluate medications/consider consulting pharmacy

## 2019-02-08 NOTE — PROGRESS NOTES
Received report from anesthesia staff on vital signs and status of patient.     Scopes precleaned at bedside by Liv Lovelace

## 2019-02-08 NOTE — ANESTHESIA PREPROCEDURE EVALUATION
Anesthetic History No history of anesthetic complications Review of Systems / Medical History Patient summary reviewed, nursing notes reviewed and pertinent labs reviewed Pulmonary Sleep apnea: CPAP Neuro/Psych Within defined limits Cardiovascular Within defined limits Exercise tolerance: >4 METS 
  
GI/Hepatic/Renal 
  
GERD: well controlled Endo/Other Arthritis Other Findings Physical Exam 
 
Airway Mallampati: III 
TM Distance: > 6 cm Neck ROM: normal range of motion Mouth opening: Normal 
 
 Cardiovascular Regular rate and rhythm,  S1 and S2 normal,  no murmur, click, rub, or gallop Dental 
No notable dental hx Pulmonary Breath sounds clear to auscultation Abdominal 
GI exam deferred Other Findings Anesthetic Plan ASA: 2 Anesthesia type: MAC Induction: Intravenous Anesthetic plan and risks discussed with: Patient

## 2019-02-08 NOTE — H&P
118 [facility]3]e.  217 [facility] 140 [facility], [address]   [phone]                                History and Physical     NAME: Yane Cardenas   :  1960   MRN:  [id number]     HPI:  The patient was seen and examined.     Past Surgical History:   Procedure Laterality Date    HX COLONOSCOPY      HX ENDOSCOPY      HX HEENT      WISDOM TEETH    HX HIP REPLACEMENT Right 2016    total     Past Medical History:   Diagnosis Date    Chronic pain     Coagulation disorder (HCC)     HX ANEMIA    GERD (gastroesophageal reflux disease)     OCCASIONAL    Sleep apnea     CPAP     Social History     Tobacco Use    Smoking status: Never Smoker    Smokeless tobacco: Never Used   Substance Use Topics    Alcohol use: Yes     Comment: occassionally    Drug use: No     No Known Allergies  Family History   Problem Relation Age of Onset    Heart Disease Mother         CHF    Hypertension Mother     Lung Disease Father         COPD    Alcohol abuse Father     Heart Disease Sister         VALVE REPLACEMENT    Heart Attack Maternal Grandmother     Cancer Maternal Grandfather         COLON    Heart Attack Paternal Grandmother     Heart Attack Paternal Grandfather     Anesth Problems Neg Hx      Current Facility-Administered Medications   Medication Dose Route Frequency    0.9% sodium chloride infusion 250 mL  250 mL IntraVENous PRN    0.9% sodium chloride infusion 250 mL  250 mL IntraVENous PRN    sodium chloride (NS) flush 5-40 mL  5-40 mL IntraVENous Q8H    sodium chloride (NS) flush 5-40 mL  5-40 mL IntraVENous PRN    magnesium oxide (MAG-OX) tablet 400 mg  400 mg Oral DAILY    sodium chloride (NS) flush 5-40 mL  5-40 mL IntraVENous Q8H    sodium chloride (NS) flush 5-40 mL  5-40 mL IntraVENous PRN    pantoprazole (PROTONIX) 40 mg in sodium chloride 0.9% 10 mL injection  40 mg IntraVENous Q12H    0.9% sodium chloride infusion  75 mL/hr IntraVENous CONTINUOUS         PHYSICAL EXAM:  General: WD, WN. Alert, cooperative, no acute distress    HEENT: NC, Atraumatic. PERRLA, EOMI. Anicteric sclerae. Lungs:  CTA Bilaterally. No Wheezing/Rhonchi/Rales. Heart:  Regular  rhythm,  No murmur, No Rubs, No Gallops  Abdomen: Soft, Non distended, Non tender.  +Bowel sounds, no HSM  Extremities: No c/c/e  Neurologic:  CN 2-12 gi, Alert and oriented X 3. No acute neurological distress   Psych:   Good insight. Not anxious nor agitated. The heart, lungs and mental status were satisfactory for the administration of MAC sedation and for the procedure.       Mallampati score: 2       Assessment:   · Hematochezia    Plan:   · Endoscopic procedure :egd/cscope  · MAC sedation

## 2019-02-08 NOTE — PROGRESS NOTES
Attempted to call report. Nurse busy with another patient.  Talked to Jose Francisco Farrar will call back again

## 2019-02-08 NOTE — PROGRESS NOTES
Bedside and Verbal shift change report given to Stuart RN (oncoming nurse) by Dana Garcia RN (offgoing nurse). Report included the following information SBAR, Kardex, MAR, Recent Results and Cardiac Rhythm NSR.

## 2019-02-08 NOTE — PROGRESS NOTES
A Spiritual Care Partner Volunteer visited patient in 12 on 2/08/2019.   Documented by:  Chaplain Chua MDiv, MS, [address]  [facility] PRA ([id number])

## 2019-02-08 NOTE — DISCHARGE SUMMARY
Discharge Summary       PATIENT ID: Valery Garcia  MRN: [id number]   YOB: 1960    DATE OF ADMISSION: 2/6/2019 12:38 PM    DATE OF DISCHARGE: 2/8/2019   PRIMARY CARE PROVIDER: Lloyd Plata DO     ATTENDING PHYSICIAN: Esteban Beckford MD    DISCHARGING PROVIDER: Esteban Beckford MD    To contact this individual call [phone] and ask the  to page. If unavailable ask to be transferred the Adult Hospitalist Department. CONSULTATIONS: IP CONSULT TO HOSPITALIST  IP CONSULT TO GASTROENTEROLOGY    PROCEDURES/SURGERIES: Procedure(s):  ESOPHAGOGASTRODUODENOSCOPY (EGD)  COLONOSCOPY  ESOPHAGOGASTRODUODENAL (EGD) BIOPSY    ADMITTING [address] [place] [address] COURSE:   60-year-old gentleman with past medical history of hemorrhoid and  environmental allergies admitted due to symptomatic anemia. He had colonoscopy 1-2 yrs ago and was found to have internal and external hemmorohoids. #Acute blood loss anemia:  -patient said he had bleeding per rectum few days ago which he thought was due to hemorrhoids and did not seek care as it usually subsided by itself.   -Hb was 6.4 at admission - s/p 2 U PRBCs  -Hb improved to 7.9 and was stable  -GI consulted   -s/p colonoscopy :  Rectum: small external hemorrhoids, large (grade III internal hemorrhoids), no active bleeding  Sigmoid: few small diverticula  Descending Colon: normal  Transverse Colon: normal  Ascending Colon: normal  Cecum: normal  Terminal Ileum: normal    EGD:  Esophagus:normal, small sliding hiatal hernia  Stomach:normal   Duodenum/jejunum:normal, biopsies obtained      -patient was instructed to follow up with Angela Méndez next week for possible surgical intervention for hemorrhoids.     -Labs reviewed - haptoglobin,LDH,TSH nl. Iron profile done after transfusion - this is not accurate as he had blood transfusion.     History of hemorrhoids s/p banding last year.             PENDING TEST RESULTS:   At the time of discharge the following test results are still pending: biopsy reports pening    FOLLOW UP APPOINTMENTS:    Follow-up Information     Follow up With Specialties Details Why Contact Info    Miguel Angel Del Rio DO Family Practice In 1 week  [address]  SHANTELLE Smalls 11 [address] [facility]      Claire Billings MD Colon and Rectal Surgery In 1 week  200 [facility]  [address]151 [facility]      Jeffy Schneider MD Gastroenterology  As needed 8[address] [facility]  [phone]             ADDITIONAL CARE RECOMMENDATIONS:   1)Follow up with  Archana Brewster in 1 week   2)Repeat CBC in 1 week  3)Follow up with PCP  4) Avoid constipation    DIET: regular diet    ACTIVITY: Activity as tolerated    WOUND CARE: na    EQUIPMENT needed: na      DISCHARGE MEDICATIONS:  Current Discharge Medication List      START taking these medications    Details   ferrous sulfate (IRON) 325 mg (65 mg iron) tablet Take 1 Tab by mouth Daily (before breakfast). Qty: 30 Tab, Refills: 0         CONTINUE these medications which have NOT CHANGED    Details   turmeric (CURCUMIN) 400 mg by Does Not Apply route.      magnesium oxide (MAG-OX) 400 mg tablet Take 400 mg by mouth daily. ergocalciferol (VITAMIN D2) 50,000 unit capsule Take 50,000 Units by mouth. phytonadione, vitamin K1, (MEPHYTON) 5 mg tablet Take  by mouth now.      milk thistle 500 mg cap Take  by mouth. co-enzyme Q-10 (COQ-10) 100 mg capsule Take 100 mg by mouth daily. omega-3 acid ethyl esters (LOVAZA) 1 gram capsule Take 1 g by mouth two (2) times a day. calcium carbonate (CALTREX) 600 mg calcium (1,500 mg) tablet Take 600 mg by mouth two (2) times a day. OTHER       loratadine (CLARITIN) 10 mg tablet Take 10 mg by mouth daily as needed for Allergies. MULTIVITAMIN PO Take  by mouth daily. cholecalciferol (VITAMIN D3) 1,000 unit cap Take 6,000 Units by mouth daily.                NOTIFY YOUR PHYSICIAN FOR ANY OF THE FOLLOWING:   Fever over 101 degrees for 24 hours. Chest pain, shortness of breath, fever, chills, nausea, vomiting, diarrhea, change in mentation, falling, weakness, bleeding. Severe pain or pain not relieved by medications. Or, any other signs or symptoms that you may have questions about. DISPOSITION:   X Home With:   OT  PT  HH  RN       Long term SNF/Inpatient Rehab    Independent/assisted living    Hospice    Other:       PATIENT CONDITION AT DISCHARGE:     Functional status    Poor     Deconditioned    X Independent      Cognition   X  Lucid     Forgetful     Dementia      Catheters/lines (plus indication)    Heck     PICC     PEG    X None      Code status     Full code     DNR      PHYSICAL EXAMINATION AT DISCHARGE:     Constitutional:  No acute distress, cooperative, pleasant    ENT:  Oral mucous moist, oropharynx benign. Neck supple,    Resp:  CTA bilaterally. No wheezing/rhonchi/rales. No accessory muscle use   CV:  Regular rhythm, normal rate, no murmurs, gallops, rubs    GI:  Soft, non distended, non tender. normoactive bowel sounds, no hepatosplenomegaly     Musculoskeletal:  No edema, warm, 2+ pulses throughout    Neurologic:  Moves all extremities. AAOx3, CN II-XII reviewed                         Psych:  Good insight, Not anxious nor agitated. CHRONIC MEDICAL DIAGNOSES:  Problem List as of 2/8/2019 Date Reviewed: 2/6/2019          Codes Class Noted - Resolved    * (Principal) Anemia ICD-10-CM: D64.9  ICD-9-CM: 285.9  2/6/2019 - Present        Severe obesity (BMI 35.0-39. 9) ICD-10-CM: E66.01  ICD-9-CM: 278.01  8/29/2018 - Present        Degenerative joint disease (DJD) of hip ICD-10-CM: M16.9  ICD-9-CM: 715.95  1/23/2017 - Present    Overview Addendum 1/23/2017 10:45 PM by Daniel Almaguer PA-C     RIGHT TOTAL HIP REPLACEMENT 1-24-17             Sleep apnea ICD-10-CM: G47.30  ICD-9-CM: 780.57  Unknown - Present    Overview Signed 1/23/2017 10:43 PM by Francis Mcginnis Mohinder OLEA PA-C     CPAP             GERD (gastroesophageal reflux disease) ICD-10-CM: K21.9  ICD-9-CM: 530.81  Unknown - Present    Overview Signed 1/23/2017 10:43 PM by Sis Gaston PA-C     OCCASIONAL             Coagulation disorder [facility]) ICD-10-CM: D68.9  ICD-9-CM: 286.9  Unknown - Present    Overview Signed 1/23/2017 10:43 PM by Sis Gaston PA-C     HX ANEMIA             Chronic pain ICD-10-CM: G89.29  ICD-9-CM: 338.29  Unknown - Present              35 minutes were spent with the patient on counseling and coordination of care    Signed:   Francisco Alexis MD  2/8/2019  6:53 PM

## 2019-02-08 NOTE — PROGRESS NOTES
TRANSFER - OUT REPORT:    Verbal report given to [facility]5 [facility] RN(name) on Yane Cardenas  being transferred to  (unit) for routine progression of care       Report consisted of patients Situation, Background, Assessment and   Recommendations(SBAR). Information from the following report(s) SBAR, MAR, Recent Results and Cardiac Rhythm SR was reviewed with the receiving nurse. Lines:   Peripheral IV 02/06/19 Right Arm (Active)   Site Assessment Clean, dry, & intact 2/8/2019  4:00 AM   Phlebitis Assessment 0 2/8/2019  4:00 AM   Infiltration Assessment 0 2/8/2019  4:00 AM   Dressing Status Clean, dry, & intact 2/8/2019  4:00 AM   Dressing Type Tape;Transparent 2/8/2019  4:00 AM   Hub Color/Line Status Pink;Flushed;Capped 2/8/2019  4:00 AM   Action Taken Open ports on tubing capped 2/8/2019  4:00 AM   Alcohol Cap Used Yes 2/8/2019  4:00 AM       Peripheral IV 02/06/19 Left Antecubital (Active)   Site Assessment Clean, dry, & intact 2/8/2019  4:00 AM   Phlebitis Assessment 0 2/8/2019  4:00 AM   Infiltration Assessment 0 2/8/2019  4:00 AM   Dressing Status Clean, dry, & intact 2/8/2019  4:00 AM   Dressing Type Tape;Transparent 2/8/2019  4:00 AM   Hub Color/Line Status Green; Infusing 2/8/2019  4:00 AM   Action Taken Open ports on tubing capped 2/8/2019  4:00 AM   Alcohol Cap Used Yes 2/8/2019  4:00 AM        Opportunity for questions and clarification was provided.       Patient transported with:   Registered Nurse

## 2019-02-08 NOTE — ROUTINE PROCESS
Bedside shift change report given to Trent (oncoming nurse) by Adamaris Franks (offgoing nurse). Report included the following information SBAR, Kardex, MAR and Recent Results.

## 2019-02-08 NOTE — PROCEDURES
[address]  [address], [address]  [phone]                           Colonoscopy and EGD Procedure Note      Indications:  Iron deficiency anemia, hematochezia     :  Veronica Padilla MD    Referring Provider: Jaja Pizarro DO    Sedation:  MAC anesthesia    Procedure Details:  After informed consent was obtained with all risks and benefits of procedure explained and pre-operative exam completed, pt was placed in the left lateral decubitus position. Following sequential administration of sedation as per above, the gastroscope was inserted into the mouth and advanced under direct vision to second portion of the duodenum. A careful inspection was made as the gastroscope was withdrawn, including a retroflexed view of the proximal stomach; findings and interventions are described below. EGD Findings:  Esophagus:normal, small sliding hiatal hernia  Stomach:normal   Duodenum/jejunum:normal, biopsies obtained    EGD Interventions:  biopsies    The bed was then turned and upon sequential sedation as per above, a digital rectal exam was performed per below. The Olympus videocolonoscope was inserted in the rectum and carefully advanced to the terminal ileum. The quality of preparation was good. Newton Bowel Prep Score 2/2/2. The colonoscope was slowly withdrawn with careful evaluation between folds. Retroflexion in the rectum was performed. Colon Findings:   Rectum: small external hemorrhoids, large (grade III internal hemorrhoids), no active bleeding  Sigmoid: few small diverticula  Descending Colon: normal  Transverse Colon: normal  Ascending Colon: normal  Cecum: normal  Terminal Ileum: normal    Colonoscopy Interventions:  none           Specimens Removed:    ID Type Source Tests Collected by Time Destination   1 : Duodenal bx Preservative   Luzmaria Perez MD 2/8/2019 1710 Pathology     Complications: None.      EBL:  Minimal    Impression:    See Postoperative diagnosis above    Recommendations:   - Await pathology. You should receive a letter within 2 weeks. - Resume normal medications.  - Recommend outpatient appointment with colorectal surgery (Dr. Lyndsey Montes) re: hemorrhoidectomy.      Gagan Reina MD  2/8/2019  5:46 PM

## 2019-02-12 NOTE — DISCHARGE INSTRUCTIONS
Discharge Instructions       PATIENT ID: Allie Bridges  MRN: [id number]   YOB: 1960    DATE OF ADMISSION: 2/6/2019 12:38 PM    DATE OF DISCHARGE: 2/8/2019    PRIMARY CARE PROVIDER: Karin Zaragoza DO     ATTENDING PHYSICIAN: Talia Michaels MD  DISCHARGING PROVIDER: Filomena Figueroa MD    To contact this individual call [phone] and ask the  to page. If unavailable ask to be transferred the Adult Hospitalist Department.     DISCHARGE DIAGNOSES Anemia, hemorrhoids     CONSULTATIONS: IP CONSULT TO HOSPITALIST  IP CONSULT TO GASTROENTEROLOGY    PROCEDURES/SURGERIES: Procedure(s):  ESOPHAGOGASTRODUODENOSCOPY (EGD)  COLONOSCOPY  ESOPHAGOGASTRODUODENAL (EGD) BIOPSY    PENDING TEST RESULTS:   At the time of discharge the following test results are still pending: none    EGD Findings:  Esophagus:normal, small sliding hiatal hernia  Stomach:normal   Duodenum/jejunum:normal, biopsies obtained     Colon Findings:   Rectum: small external hemorrhoids, large (grade III internal hemorrhoids), no active bleeding  Sigmoid: few small diverticula  Descending Colon: normal  Transverse Colon: normal  Ascending Colon: normal  Cecum: normal  Terminal Ileum: normal      FOLLOW UP APPOINTMENTS:   Follow-up Information     Follow up With Specialties Details Why Contact Info    Karin Zaragoza DO Family Practice In 1 week  [address] MD Colon and Rectal Surgery In 1 week  [address] [address]  [phone]      Elfego Irvin MD Gastroenterology  As needed [id number]             ADDITIONAL CARE RECOMMENDATIONS:   1)Follow up with  Anabel Wyatt in 1 week   2)Repeat CBC in 1 week  3)Follow up with PCP  4) Avoid constipation    DIET: regular diet    ACTIVITY: Activity as tolerated    WOUND CARE: na    EQUIPMENT needed: na      DISCHARGE MEDICATIONS:   See Medication Reconciliation Form    · It is important that you take the medication exactly as they are prescribed. · Keep your medication in the bottles provided by the pharmacist and keep a list of the medication names, dosages, and times to be taken in your wallet. · Do not take other medications without consulting your doctor. NOTIFY YOUR PHYSICIAN FOR ANY OF THE FOLLOWING:   Fever over 101 degrees for 24 hours. Chest pain, shortness of breath, fever, chills, nausea, vomiting, diarrhea, change in mentation, falling, weakness, bleeding. Severe pain or pain not relieved by medications. Or, any other signs or symptoms that you may have questions about.       DISPOSITION:  X  Home With:   OT  PT  HH  RN       SNF/Inpatient Rehab/LTAC    Independent/assisted living    Hospice    Other:           Signed:   Teofilo Best MD  2/8/2019  6:44 PM

## 2019-02-13 ENCOUNTER — HOSPITAL ENCOUNTER (EMERGENCY)
Age: 59
Discharge: HOME | End: 2019-02-13
Attending: STUDENT IN AN ORGANIZED HEALTH CARE EDUCATION/TRAINING PROGRAM
Payer: COMMERCIAL

## 2019-02-13 VITALS
RESPIRATION RATE: 14 BRPM | SYSTOLIC BLOOD PRESSURE: 108 MMHG | TEMPERATURE: 98.5 F | HEART RATE: 70 BPM | DIASTOLIC BLOOD PRESSURE: 65 MMHG | OXYGEN SATURATION: 100 %

## 2019-02-13 DIAGNOSIS — D50.0 IRON DEFICIENCY ANEMIA DUE TO CHRONIC BLOOD LOSS: Primary | ICD-10-CM

## 2019-02-13 LAB
ALBUMIN SERPL-MCNC: 3.8 G/DL (ref 3.5–5)
ALBUMIN/GLOB SERPL: 1.2 {RATIO} (ref 1.1–2.2)
ALP SERPL-CCNC: 63 U/L (ref 45–117)
ALT SERPL-CCNC: 21 U/L (ref 12–78)
ANION GAP SERPL CALC-SCNC: 6 MMOL/L (ref 5–15)
AST SERPL-CCNC: 13 U/L (ref 15–37)
BASOPHILS # BLD: 0 K/UL (ref 0–0.1)
BASOPHILS NFR BLD: 0 % (ref 0–1)
BILIRUB SERPL-MCNC: 0.4 MG/DL (ref 0.2–1)
BUN SERPL-MCNC: 21 MG/DL (ref 6–20)
BUN/CREAT SERPL: 12 (ref 12–20)
CALCIUM SERPL-MCNC: 8.4 MG/DL (ref 8.5–10.1)
CHLORIDE SERPL-SCNC: 108 MMOL/L (ref 97–108)
CO2 SERPL-SCNC: 25 MMOL/L (ref 21–32)
COMMENT, HOLDF: NORMAL
CREAT SERPL-MCNC: 1.72 MG/DL (ref 0.7–1.3)
DIFFERENTIAL METHOD BLD: ABNORMAL
EOSINOPHIL # BLD: 0.2 K/UL (ref 0–0.4)
EOSINOPHIL NFR BLD: 4 % (ref 0–7)
ERYTHROCYTE [DISTWIDTH] IN BLOOD BY AUTOMATED COUNT: 21.6 % (ref 11.5–14.5)
GLOBULIN SER CALC-MCNC: 3.1 G/DL (ref 2–4)
GLUCOSE SERPL-MCNC: 90 MG/DL (ref 65–100)
HCT VFR BLD AUTO: 25.9 % (ref 36.6–50.3)
HGB BLD-MCNC: 7.1 G/DL (ref 12.1–17)
IMM GRANULOCYTES # BLD AUTO: 0 K/UL (ref 0–0.04)
IMM GRANULOCYTES NFR BLD AUTO: 0 % (ref 0–0.5)
LYMPHOCYTES # BLD: 0.5 K/UL (ref 0.8–3.5)
LYMPHOCYTES NFR BLD: 13 % (ref 12–49)
MCH RBC QN AUTO: 21.3 PG (ref 26–34)
MCHC RBC AUTO-ENTMCNC: 27.4 G/DL (ref 30–36.5)
MCV RBC AUTO: 77.8 FL (ref 80–99)
MONOCYTES # BLD: 0.5 K/UL (ref 0–1)
MONOCYTES NFR BLD: 12 % (ref 5–13)
NEUTS SEG # BLD: 3 K/UL (ref 1.8–8)
NEUTS SEG NFR BLD: 71 % (ref 32–75)
NRBC # BLD: 0 K/UL (ref 0–0.01)
NRBC BLD-RTO: 0 PER 100 WBC
PLATELET # BLD AUTO: 206 K/UL (ref 150–400)
PMV BLD AUTO: 9.5 FL (ref 8.9–12.9)
POTASSIUM SERPL-SCNC: 4.2 MMOL/L (ref 3.5–5.1)
PROT SERPL-MCNC: 6.9 G/DL (ref 6.4–8.2)
RBC # BLD AUTO: 3.33 M/UL (ref 4.1–5.7)
RBC MORPH BLD: ABNORMAL
SAMPLES BEING HELD,HOLD: NORMAL
SODIUM SERPL-SCNC: 139 MMOL/L (ref 136–145)
WBC # BLD AUTO: 4.2 K/UL (ref 4.1–11.1)

## 2019-02-13 PROCEDURE — 85025 COMPLETE CBC W/AUTO DIFF WBC: CPT

## 2019-02-13 PROCEDURE — 36430 TRANSFUSION BLD/BLD COMPNT: CPT

## 2019-02-13 PROCEDURE — 99284 EMERGENCY DEPT VISIT MOD MDM: CPT

## 2019-02-13 PROCEDURE — P9016 RBC LEUKOCYTES REDUCED: HCPCS

## 2019-02-13 PROCEDURE — 86900 BLOOD TYPING SEROLOGIC ABO: CPT

## 2019-02-13 PROCEDURE — 80053 COMPREHEN METABOLIC PANEL: CPT

## 2019-02-13 PROCEDURE — 36415 COLL VENOUS BLD VENIPUNCTURE: CPT

## 2019-02-13 PROCEDURE — 86923 COMPATIBILITY TEST ELECTRIC: CPT

## 2019-02-13 PROCEDURE — 77030013169 SET IV BLD ICUM -A

## 2019-02-13 RX ORDER — SODIUM CHLORIDE 9 MG/ML
250 INJECTION, SOLUTION INTRAVENOUS AS NEEDED
Status: DISCONTINUED | OUTPATIENT
Start: 2019-02-13 | End: 2019-02-13 | Stop reason: HOSPADM

## 2019-02-13 NOTE — PROGRESS NOTES
Date of previous inpatient admission/ ED visit? Last IP admission was 02/06/2019-02/08/2019 for Anemia. RRAT score 2. Pt has had 3 ED visits in the past 6 months and 0 IP admissions in the past 12 months. What brought the patient back to ED? Patient was referred here by his doctor because his HGB has dropped to 6.3 after his last blood transfusion on Thursday 2/7. Patient reports he received 2 units     Did patient decline recommended services during last admission/ ED visit (if yes, what)? No    Has patient seen a provider since their last inpatient admission/ED visit (if yes, when)? Pt saw PCP yesterday, 02/12/2019. CM Interventions:  From previous inpatient admission/ED visit: No CM consults/needs noted at time of discharge. From current inpatient admission/ED visit: Pt currently being evaluated in the ED. Disposition needs TBD/subject to change pending care recommendations. CM will await further orders should any need arise. Care Management Interventions  PCP Verified by CM: Yes  Last Visit to PCP: 02/12/19  Palliative Care Criteria Met (RRAT>21 & CHF Dx)?: No  Mode of Transport at Discharge: Self  Transition of Care Consult (CM Consult):  Other(No CM consult noted at this time)  MyChart Signup: No  Discharge Durable Medical Equipment: No  Physical Therapy Consult: No  Occupational Therapy Consult: No  Speech Therapy Consult: No  Current Support Network: Lives with Spouse, Own Home  Confirm Follow Up Transport: Self  Plan discussed with Pt/Family/Caregiver: Yes  Discharge Location  Discharge Placement: Home(Disposition needs TBD/subject to change pending care recommendations)    Cheryl Luevano RN, BSN  Care Management Department

## 2019-02-13 NOTE — DISCHARGE INSTRUCTIONS
Patient Education        Learning About Blood Transfusions  What is a blood transfusion? Blood transfusion is a medical treatment to replace the blood or parts of blood that your body has lost. The blood goes through a tube from a bag to an intravenous (IV) catheter and into your vein. You may need a blood transfusion after losing blood from an injury, a major surgery, an illness that causes bleeding, or an illness that destroys blood cells. Transfusions are also used to give you the parts of blood--such as platelets, plasma, or substances that cause clotting--that your body needs to fight an illness or stop bleeding. How is a blood transfusion done? Before you receive a blood transfusion, your blood is tested to find out what your blood type is. Blood or blood parts that are a match with your blood type are ordered by your doctor. Blood is typed as A, B, AB, or O. It is also typed as Rh-positive or Rh-negative. Your blood is also screened to look for antibodies that might react with the blood that is given to you. The blood you are getting is checked and rechecked to make sure that it's the right type for you. A sample of your blood is mixed with a sample of the blood you will receive to check for problems. Before actually giving you the transfusion, a doctor and nurses will look at the label on the package of blood and compare it to your hospital ID bracelet and medical records. The transfusion begins only when all agree that this is the correct blood and that you are the correct person to receive it. To receive the transfusion, you will have an intravenous (IV) catheter inserted into a vein. A tube connects the catheter to the bag containing the blood, which is placed higher than your body. The blood then flows slowly into your vein. A doctor or nurse will check you several times during the transfusion to watch for a reaction or other problems. What are the possible risks?   Blood transfusions have many benefits and are often life-saving. But they also have a few risks. Possible risks include:  · Your body's reaction to receiving new blood. This may include:  ? Fever. ? Allergic reactions. ? Breathing problems. · An infection from the blood. This risk is small because of the strict rules placed on handling and storing blood. Getting a viral infection, such as HIV or hepatitis B or C, through blood transfusions has become very rare. The U.S. Food and Drug Administration (FDA) enforces strict guidelines on the collection, testing, storage, and use of blood. · Getting the wrong blood type by accident. Severe reactions, which can be life-threatening, are very rare. What can you expect after a blood transfusion? Here are some things you can do at home to help prevent infection at the transfusion site:  · Wash the area daily with warm, soapy water, and pat it dry. Don't use hydrogen peroxide or alcohol, which can slow healing. You may cover the area with a gauze bandage if it weeps or rubs against clothing. Change the bandage every day. · Keep the area clean and dry. When should you call for help? Call 911 anytime you think you may need emergency care. For example, call if:  · You have severe trouble breathing. Call your doctor now or seek immediate medical care if:  · You have a fever. · You feel weaker or more tired than usual.  · You have a yellow tint to your skin or the whites of your eyes. Watch closely for changes in your health, and be sure to contact your doctor if you have any problems. Follow-up care is a key part of your treatment and safety. Be sure to make and go to all appointments, and call your doctor if you are having problems. It's also a good idea to know your test results and keep a list of the medicines you take. Where can you learn more? Go to http://bi-edward.info/. Enter Z682 in the search box to learn more about \"Learning About Blood Transfusions. \"  Current as of: May 6, 2018  Content Version: 11.9  © 3238-6903 [employer]. Care instructions adapted under license by [employer] (which disclaims liability or warranty for this information). If you have questions about a medical condition or this instruction, always ask your healthcare professional. [address] any warranty or liability for your use of this information.

## 2019-02-13 NOTE — ED TRIAGE NOTES
Patient was referred here by his doctor because his HGB has dropped to 6.3 after his last blood transfusion on Thursday 2/7.   Patient reports he received 2 units

## 2019-02-13 NOTE — H&P
[address] [facility] HISTORY AND PHYSICAL Name:  Caterina Cash 
MR#:  [id number] :  1960 ACCOUNT #:  [de-identified] ADMIT DATE:  2019 CHIEF COMPLAINT:  Shortness of breath. HISTORY OF PRESENT ILLNESS:  The patient is a 81-year-old gentleman with past medical 
history of hemorrhoid and environmental allergies, who presents to the hospital with 
the above-mentioned symptoms. The patient reports that for the past few days, he has 
been getting increasing shortness of breath, especially when he exerts himself. The 
patient reports the shortness of breath is mild and not accompanied by any other 
symptoms. The patient reports that yesterday he got concerned and decided to go to 
his PCP. The patient had some blood work done, was found to have low hemoglobin and 
was sent to the ER at [facility], but had a long wait so decided to leave. The 
patient came back today _____01:10 because his wife noticed that he was pale in 
color, was found to have hemoglobin of 6 and was requested to be admitted into the 
hospitalist service. The patient also reports that he has been having some fatigue 
for the last few days. The patient denies any chest pain associated with the 
symptoms. Denies any headache, blurry vision, sore throat, trouble swallowing, 
trouble with speech, any nausea or vomiting, abdominal pain, constipation, diarrhea, 
urinary symptoms, focal or generalized neurological weakness, recent travel, sick 
contacts, falls, injuries, hematemesis, melena, hemoptysis, hematuria, or any other 
concerns or problems. The patient reports that he has history of hemorrhoids. About 2 weeks back, he had \"some problem with hemorrhoids,\" but it went away after using Preparation H. The patient denies any other complaints or problems. The patient was 
transferred from ______ for further management and evaluation. PAST MEDICAL HISTORY:  See above. HOME MEDICATIONS:  Currently, the patient is on turmeric, magnesium oxide 400 mg 
daily. vitamin D2, vitamin K, milk of Silk, Coenzyme Q10, omega-3 fatty 
acids(Lovaza), calcium carbonate, multivitamin, probiotic, cetirizine, and 
cholecalciferol. SOCIAL HISTORY:  Denies tobacco abuse. Occasional alcohol. Denies IV drug abuse. FAMILY HISTORY:  Mother has a history of heart disease and CHF, hypertension. Father 
has a history of lung disease. Father has history of alcohol abuse. A sister has a 
history of heart valve replacement. Grandmother has a history of heart attack. ALLERGIES:  NO KNOWN DRUG ALLERGIES. REVIEW OF SYSTEMS:   All systems were reviewed and found to be essentially negative 
except for the symptoms as mentioned above. PHYSICAL EXAMINATION: 
VITAL SIGNS:  Temperature 98.2, pulse 74, respiratory rate 10, blood pressure 119/71, 
pulse ox 100% on room air. GENERAL:  Alert x3, awake, mildly distressed, pleasant male, appears to be stated 
age. HEENT:  Pupils equal and reactive to light. Dry mucous membranes. Tympanic 
membranes are clear. NECK:  Supple. CHEST:  Clear to auscultation bilaterally. COR:  S1, S2 heard. ABDOMEN:  Soft, nontender, nondistended. Bowel sounds are physiological. 
EXTREMITIES:  No clubbing, no cyanosis, no edema. NEURO/PSYCH:  Pleasant mood and affect. Cranial nerves II through XII grossly 
intact. Sensory grossly within normal limits. DTRs 2+ x4. Strength 5/5. SKIN:  Warm. LABORATORY DATA:  White count 4.2, hemoglobin 6.0, hematocrit 21.5, platelets 475. Sodium 140, potassium 3.9, chloride 103, bicarb 25, anion gap 12, glucose 92, BUN 16, 
creatinine 1.02, calcium 8.7, bili total 0.4. ALT 36, AST 19, alk phos 62, glucose 92. X-ray of the chest shows no acute cardiopulmonary disease. EKG shows normal sinus 
rhythm and no acute ST elevation. ASSESSMENT AND PLAN: 
1. Acute symptomatic anemia, unclear etiology.   The patient will be admitted on a 
telemetry bed. We will type and cross 2 units PRBC that were ordered in the ER as 
part some of the consent was lost, so I spoke with the patient and provided the 
details and the risks associated with the transfusion. The patient accepts the risk 
and a consent was signed. We will get Gastroenterology involved. We will start the 
patient on Protonix b.i.d. The ER physician tried to do _____ Hemoccult on the 
patient, but could not because there was no stool in the rectal vault. We will send 
stool for occult blood. Keep on clear liquid diet for now. Monitor H and H and 
further intervention per hospital course. We will also get some basic anemia labs 
including iron panel, haptoglobin, LDH, Crissy, and ferritin levels. May consider 
getting a Hematology consult if the symptoms persists. Further intervention per 
hospital course. Continue to monitor. 2.  History of hemorrhoids, currently stable. Continue to monitor. 3.  Gastrointestinal and deep vein thrombosis prophylaxis. The patient will be on 
sequential compression devices. Vera Guaman MD 
 
 
[id number] 
D:  02/06/2019 17:14 
T:  02/13/2019 18:09JOB #:  [id number]

## 2019-02-13 NOTE — ED PROVIDER NOTES
63 y.o. male with past medical history significant for GERD, chronic pain, and coagulation disorder who presents from home via private vehicle for a chief complaint of SOB. Patient was seen here 8 days ago for IVY for 2-3 weeks, seen by his PCP who sent to the ED for low HGB. In the ED, his HGB was 6.2 and recommended to be admitted, however, the patient left AMA due to the wait. Patient represented to the ED one week ago to be admitted. Patient stayed two nights, received 2 units pRBCs, and had an EGD and colonoscopy which showed large grade III internal hemorrhoids, no active bleeding, and a few small diverticula. Patient also had iron studies on 2/7 that were unremarkable, and an appropriate reticulocyte count. Patient was discharged to F/U with colorectal for possible surgical intervention for hemorrhoids with a stable HGB of 7.9. Patient states he is scheduled to see Colorectal this afternoon. However, he reports continued IVY and pallor, so he saw his PCP yesterday, was called today reporting a HGB of 6.3 and to come back to the ED. Pt denies HA, dizziness, or bloody stools. There are no other acute medical concerns at this time. Social hx: No use of tobacco; occasional use of EtOH. PCP: Hang Solorzano DO  Colorectal Surgery: John Peter MD    Note written by Alena Burrell, as dictated by Anna Valladares MD 10:48 AM      The history is provided by the patient and medical records. No  was used.         Past Medical History:   Diagnosis Date    Chronic pain     Coagulation disorder (HCC)     HX ANEMIA    GERD (gastroesophageal reflux disease)     OCCASIONAL    Sleep apnea     CPAP       Past Surgical History:   Procedure Laterality Date    COLONOSCOPY N/A 2/8/2019    COLONOSCOPY performed by K[facility]ice Vick MD at [facility] ENDOSCOPY    HX COLONOSCOPY      HX ENDOSCOPY      HX HEENT      WISDOM TEETH    HX HIP REPLACEMENT Right 2016    total         Family History: Problem Relation Age of Onset    Heart Disease Mother         CHF    Hypertension Mother     Lung Disease Father         COPD    Alcohol abuse Father     Heart Disease Sister         VALVE REPLACEMENT    Heart Attack Maternal Grandmother     Cancer Maternal Grandfather         COLON    Heart Attack Paternal Grandmother     Heart Attack Paternal Grandfather     Anesth Problems Neg Hx        Social History     Socioeconomic History    Marital status:      Spouse name: Not on file    Number of children: Not on file    Years of education: Not on file    Highest education level: Not on file   Social Needs    Financial resource strain: Not on file    Food insecurity - worry: Not on file    Food insecurity - inability: Not on file   [employer] needs - medical: Not on file   [employer] needs - non-medical: Not on file   Occupational History    Not on file   Tobacco Use    Smoking status: Never Smoker    Smokeless tobacco: Never Used   Substance and Sexual Activity    Alcohol use: Yes     Comment: occassionally    Drug use: No    Sexual activity: Not on file   Other Topics Concern    Not on file   Social History Narrative    Not on file         ALLERGIES: Patient has no known allergies. Review of Systems   Constitutional: Negative for chills, diaphoresis, fatigue and fever. HENT: Negative for congestion, rhinorrhea, sinus pressure, sore throat, trouble swallowing and voice change. Eyes: Negative for photophobia and visual disturbance. Respiratory: Positive for shortness of breath (With exertion). Negative for cough and chest tightness. Cardiovascular: Negative for chest pain, palpitations and leg swelling. Gastrointestinal: Negative for abdominal pain, blood in stool, constipation, diarrhea, nausea and vomiting. Musculoskeletal: Negative for arthralgias, myalgias and neck pain. Skin: Positive for pallor.    Neurological: Negative for dizziness, weakness, light-headedness, numbness and headaches. All other systems reviewed and are negative. Vitals:    02/13/19 1013 02/13/19 1024   BP:  133/62   Pulse: 93 76   Resp:  13   Temp:  98.4 °F (36.9 °C)   SpO2: 100% 100%            Physical Exam   Constitutional: He is oriented to person, place, and time. He appears well-developed and well-nourished. No distress. HENT:   Head: Normocephalic and atraumatic. Nose: Nose normal.   Mouth/Throat: Oropharynx is clear and moist. No oropharyngeal exudate. Small ulcer inside of gum line, back left molar. Eyes: EOM are normal. Right eye exhibits no discharge. Left eye exhibits no discharge. No scleral icterus. Bilateral conjunctivae are pale. Neck: Normal range of motion. Neck supple. No JVD present. No tracheal deviation present. No thyromegaly present. Cardiovascular: Normal rate, regular rhythm, normal heart sounds and intact distal pulses. Exam reveals no gallop and no friction rub. No murmur heard. Pulmonary/Chest: Effort normal and breath sounds normal. No stridor. No respiratory distress. He has no wheezes. He has no rales. He exhibits no tenderness. Abdominal: Bowel sounds are normal. He exhibits no distension and no mass. There is no tenderness. There is no rebound. Musculoskeletal: Normal range of motion. He exhibits no edema or tenderness. Lymphadenopathy:     He has no cervical adenopathy. Neurological: He is alert and oriented to person, place, and time. No cranial nerve deficit. Coordination normal.   Skin: Skin is warm and dry. No rash noted. He is not diaphoretic. No erythema. There is pallor. Pale appearing. Psychiatric: He has a normal mood and affect. His behavior is normal. Judgment and thought content normal.   Nursing note and vitals reviewed.      Note written by Alena Carrillo, as dictated by Jaky Araujo MD 10:48 AM      MDM  Number of Diagnoses or Management Options  Iron deficiency anemia due to chronic blood loss:   Diagnosis management comments: A/P:  Chronic blood loss anemia. Type and screen, cbc, cmp. Pt to receive 1 U PRBC. Amount and/or Complexity of Data Reviewed  Clinical lab tests: ordered and reviewed  Review and summarize past medical records: yes  Independent visualization of images, tracings, or specimens: yes    Risk of Complications, Morbidity, and/or Mortality  Presenting problems: moderate  Diagnostic procedures: moderate  Management options: moderate    Critical Care  Total time providing critical care: 30-74 minutes (Total critical care time spent exclusive of procedures: 30 minutes)    Patient Progress  Patient progress: improved         Procedures    1:49 PM  Discussed results and plan of care with the patient. Currently getting one unit of pRBCs. Plan to discharge after, patient is agreeable.

## 2019-02-13 NOTE — ED NOTES
Patient cleared to be discharged post-transfusion per Joyce Moore MD. No sign of distress or transfusion reaction noted at time of discharge. Pt and Spouse given discharge instructions, patient education, 0 prescriptions, and follow up information. Pt verbalizes understanding. All questions answered. Pt discharged to home in private vehicle, ambulatory. Pt A/Ox4, RA, pain controlled.

## 2019-02-14 LAB
ABO + RH BLD: NORMAL
BLD PROD TYP BPU: NORMAL
BLOOD GROUP ANTIBODIES SERPL: NORMAL
BPU ID: NORMAL
CROSSMATCH RESULT,%XM: NORMAL
SPECIMEN EXP DATE BLD: NORMAL
STATUS OF UNIT,%ST: NORMAL
UNIT DIVISION, %UDIV: 0

## 2019-02-20 ENCOUNTER — HOSPITAL ENCOUNTER (OUTPATIENT)
Dept: INFUSION THERAPY | Age: 59
Discharge: HOME | End: 2019-02-20
Payer: COMMERCIAL

## 2019-02-20 VITALS
HEART RATE: 67 BPM | SYSTOLIC BLOOD PRESSURE: 107 MMHG | RESPIRATION RATE: 16 BRPM | DIASTOLIC BLOOD PRESSURE: 63 MMHG | TEMPERATURE: 98 F

## 2019-02-20 PROCEDURE — 36415 COLL VENOUS BLD VENIPUNCTURE: CPT

## 2019-02-20 PROCEDURE — 86920 COMPATIBILITY TEST SPIN: CPT

## 2019-02-20 PROCEDURE — 86900 BLOOD TYPING SEROLOGIC ABO: CPT

## 2019-02-20 RX ORDER — ASCORBIC ACID 500 MG
1000 TABLET ORAL DAILY
COMMUNITY

## 2019-02-20 RX ORDER — DOCUSATE SODIUM 100 MG/1
100 CAPSULE, LIQUID FILLED ORAL 2 TIMES DAILY
COMMUNITY

## 2019-02-20 RX ORDER — ACETAMINOPHEN 160 MG/5ML
200 SUSPENSION, ORAL (FINAL DOSE FORM) ORAL DAILY
COMMUNITY

## 2019-02-20 NOTE — PROGRESS NOTES
[address] [facility] VISIT NOTE 
 
1320 Patient arrives for type & cross without acute problems. Please see [facility] for complete assessment and education provided. Vital signs stable throughout and prior to discharge, Pt. Tolerated treatment well and discharged without incident. Patient/parent is aware of next hospitals appointment on 2/21/2019 at 8am. Appointment card given to patient/parents. VITAL SIGNS Patient Vitals for the past 12 hrs: 
 Temp Pulse Resp BP  
02/20/19 1324 98 °F (36.7 °C) 67 16 107/63 LAB WORK Recent Results (from the past 12 hour(s)) TYPE + CROSSMATCH Collection Time: 02/20/19  1:20 PM  
Result Value Ref Range Crossmatch Expiration 02/23/2019 ABO/Rh(D) A NEGATIVE Antibody screen NEG Unit number [id number] Blood component type RC LRIR Unit division 00 Status of unit ALLOCATED Crossmatch result Compatible Unit number [id number] Blood component type RC LR,2 Unit division 00 Status of unit ALLOCATED Crossmatch result Compatible *Missing blood consent, confirmed with patient he had never signed one or discussed with office, provided consent to patient and patient will go to MD's office to have signed. (attempted to call office several times with no answer)

## 2019-02-21 ENCOUNTER — HOSPITAL ENCOUNTER (OUTPATIENT)
Dept: INFUSION THERAPY | Age: 59
Discharge: HOME | End: 2019-02-21
Payer: COMMERCIAL

## 2019-02-21 VITALS
HEART RATE: 65 BPM | SYSTOLIC BLOOD PRESSURE: 106 MMHG | RESPIRATION RATE: 16 BRPM | DIASTOLIC BLOOD PRESSURE: 67 MMHG | TEMPERATURE: 98 F

## 2019-02-21 PROCEDURE — 74011250637 HC RX REV CODE- 250/637

## 2019-02-21 PROCEDURE — 77030013169 SET IV BLD ICUM -A

## 2019-02-21 PROCEDURE — P9016 RBC LEUKOCYTES REDUCED: HCPCS

## 2019-02-21 PROCEDURE — 74011250636 HC RX REV CODE- 250/636

## 2019-02-21 PROCEDURE — P9040 RBC LEUKOREDUCED IRRADIATED: HCPCS

## 2019-02-21 PROCEDURE — 36430 TRANSFUSION BLD/BLD COMPNT: CPT

## 2019-02-21 RX ORDER — SODIUM CHLORIDE 9 MG/ML
250 INJECTION, SOLUTION INTRAVENOUS AS NEEDED
Status: DISPENSED | OUTPATIENT
Start: 2019-02-21 | End: 2019-02-22

## 2019-02-21 RX ORDER — DIPHENHYDRAMINE HCL 25 MG
25 CAPSULE ORAL ONCE
Status: COMPLETED | OUTPATIENT
Start: 2019-02-21 | End: 2019-02-21

## 2019-02-21 RX ORDER — ACETAMINOPHEN 325 MG/1
650 TABLET ORAL ONCE
Status: COMPLETED | OUTPATIENT
Start: 2019-02-21 | End: 2019-02-21

## 2019-02-21 RX ORDER — SODIUM CHLORIDE 0.9 % (FLUSH) 0.9 %
5-10 SYRINGE (ML) INJECTION AS NEEDED
Status: DISPENSED | OUTPATIENT
Start: 2019-02-21 | End: 2019-02-22

## 2019-02-21 RX ADMIN — SODIUM CHLORIDE 250 ML: 900 INJECTION, SOLUTION INTRAVENOUS at 08:49

## 2019-02-21 RX ADMIN — ACETAMINOPHEN 650 MG: 325 TABLET ORAL at 08:48

## 2019-02-21 RX ADMIN — Medication 10 ML: at 08:49

## 2019-02-21 RX ADMIN — DIPHENHYDRAMINE HYDROCHLORIDE 25 MG: 25 CAPSULE ORAL at 08:48

## 2019-02-21 NOTE — PROGRESS NOTES
[facility] VISIT NOTE 
   
12 Patient arrives for Blood Transfusion PRBC 2 units without acute problems. Please see [facility] care for complete assessment and education provided. 24 gauge PIV placed to left forearm without difficulty; + blood return noted. Vitals Signs: 
Patient Vitals for the past 12 hrs: 
 Temp Pulse Resp BP  
02/21/19 1330  65 16 106/67  
02/21/19 1230  71 16 103/64  
02/21/19 1200  63 16 105/53  
02/21/19 1145  68 16 121/72  
02/21/19 1120 98 °F (36.7 °C) 65 16 122/73  
02/21/19 1019  (!) 103 16 116/41  
02/21/19 0949  68 16 103/64  
02/21/19 0934  67 16 111/61  
02/21/19 0915 98 °F (36.7 °C) 66 16 101/68  
02/21/19 0851 97.5 °F (36.4 °C) 72 14 120/69  
 
 
 
0919 First unit initiated. 1120 First unit completed. 1130 Second unit initiated. 1330 Second unit completed. Patient monitored post transfusion with no signs/symptoms of reaction for one hour. Educated and provided with written material regarding signs/symptoms of delayed reaction to watch for at home. Medications: 
Verified by Gavino Up RN & Savanah Chambers RN, Alysa Ross RN via [employer] 1. Tylenol PO 
2. Benadryl PO Patient's PIV was flushed; removed and bandage placed over site. Patient given education about blood transfusion signs and symptoms of reaction. 1420 Vital signs stable throughout and prior to discharge, Patient tolerated treatment well and discharged without incident.  Patient/parent is aware of next follow up with MD.

## 2019-02-22 LAB
ABO + RH BLD: NORMAL
BLD PROD TYP BPU: NORMAL
BLD PROD TYP BPU: NORMAL
BLOOD GROUP ANTIBODIES SERPL: NORMAL
BPU ID: NORMAL
BPU ID: NORMAL
CROSSMATCH RESULT,%XM: NORMAL
CROSSMATCH RESULT,%XM: NORMAL
SPECIMEN EXP DATE BLD: NORMAL
STATUS OF UNIT,%ST: NORMAL
STATUS OF UNIT,%ST: NORMAL
UNIT DIVISION, %UDIV: 0
UNIT DIVISION, %UDIV: 0

## 2019-03-05 ENCOUNTER — ANESTHESIA EVENT (OUTPATIENT)
Dept: SURGERY | Age: 59
End: 2019-03-05
Payer: COMMERCIAL

## 2019-03-05 ENCOUNTER — HOSPITAL ENCOUNTER (OUTPATIENT)
Age: 59
Setting detail: OUTPATIENT SURGERY
Discharge: HOME | End: 2019-03-05
Attending: COLON & RECTAL SURGERY | Admitting: COLON & RECTAL SURGERY
Payer: COMMERCIAL

## 2019-03-05 ENCOUNTER — ANESTHESIA (OUTPATIENT)
Dept: SURGERY | Age: 59
End: 2019-03-05
Payer: COMMERCIAL

## 2019-03-05 VITALS
RESPIRATION RATE: 17 BRPM | SYSTOLIC BLOOD PRESSURE: 132 MMHG | OXYGEN SATURATION: 97 % | HEIGHT: 70 IN | HEART RATE: 78 BPM | DIASTOLIC BLOOD PRESSURE: 59 MMHG | BODY MASS INDEX: 33.64 KG/M2 | TEMPERATURE: 97.4 F | WEIGHT: 235 LBS

## 2019-03-05 DIAGNOSIS — G89.18 ACUTE POST-OPERATIVE PAIN: Primary | ICD-10-CM

## 2019-03-05 LAB
ABO + RH BLD: NORMAL
BLOOD GROUP ANTIBODIES SERPL: NORMAL
HCT VFR BLD AUTO: 35.5 % (ref 36.6–50.3)
HGB BLD-MCNC: 10.4 G/DL (ref 12.1–17)
SPECIMEN EXP DATE BLD: NORMAL

## 2019-03-05 PROCEDURE — 74011000272 HC RX REV CODE- 272: Performed by: COLON & RECTAL SURGERY

## 2019-03-05 PROCEDURE — 77030020782 HC GWN BAIR PAWS FLX 3M -B

## 2019-03-05 PROCEDURE — 77030032490 HC SLV COMPR SCD KNE COVD -B: Performed by: COLON & RECTAL SURGERY

## 2019-03-05 PROCEDURE — 74011000250 HC RX REV CODE- 250: Performed by: COLON & RECTAL SURGERY

## 2019-03-05 PROCEDURE — 88304 TISSUE EXAM BY PATHOLOGIST: CPT

## 2019-03-05 PROCEDURE — 77030011640 HC PAD GRND REM COVD -A: Performed by: COLON & RECTAL SURGERY

## 2019-03-05 PROCEDURE — 86900 BLOOD TYPING SEROLOGIC ABO: CPT

## 2019-03-05 PROCEDURE — 77030018836 HC SOL IRR NACL ICUM -A: Performed by: COLON & RECTAL SURGERY

## 2019-03-05 PROCEDURE — 76210000020 HC REC RM PH II FIRST 0.5 HR: Performed by: COLON & RECTAL SURGERY

## 2019-03-05 PROCEDURE — 51798 US URINE CAPACITY MEASURE: CPT

## 2019-03-05 PROCEDURE — 74011250636 HC RX REV CODE- 250/636

## 2019-03-05 PROCEDURE — 76060000034 HC ANESTHESIA 1.5 TO 2 HR: Performed by: COLON & RECTAL SURGERY

## 2019-03-05 PROCEDURE — 76210000016 HC OR PH I REC 1 TO 1.5 HR: Performed by: COLON & RECTAL SURGERY

## 2019-03-05 PROCEDURE — 77030026438 HC STYL ET INTUB CARD -A: Performed by: ANESTHESIOLOGY

## 2019-03-05 PROCEDURE — 77030031139 HC SUT VCRL2 J&J -A: Performed by: COLON & RECTAL SURGERY

## 2019-03-05 PROCEDURE — 74011000250 HC RX REV CODE- 250

## 2019-03-05 PROCEDURE — 77030008684 HC TU ET CUF COVD -B: Performed by: ANESTHESIOLOGY

## 2019-03-05 PROCEDURE — 74011250637 HC RX REV CODE- 250/637

## 2019-03-05 PROCEDURE — 74011250636 HC RX REV CODE- 250/636: Performed by: ANESTHESIOLOGY

## 2019-03-05 PROCEDURE — 36415 COLL VENOUS BLD VENIPUNCTURE: CPT

## 2019-03-05 PROCEDURE — 76010000153 HC OR TIME 1.5 TO 2 HR: Performed by: COLON & RECTAL SURGERY

## 2019-03-05 PROCEDURE — 77030034626 HC LIGASURE SM JAW SEAL OPN SURG COVD -E: Performed by: COLON & RECTAL SURGERY

## 2019-03-05 PROCEDURE — 85018 HEMOGLOBIN: CPT

## 2019-03-05 RX ORDER — SODIUM CHLORIDE 9 MG/ML
25 INJECTION, SOLUTION INTRAVENOUS CONTINUOUS
Status: DISCONTINUED | OUTPATIENT
Start: 2019-03-05 | End: 2019-03-05 | Stop reason: HOSPADM

## 2019-03-05 RX ORDER — SODIUM CHLORIDE 0.9 % (FLUSH) 0.9 %
5-40 SYRINGE (ML) INJECTION EVERY 8 HOURS
Status: DISCONTINUED | OUTPATIENT
Start: 2019-03-05 | End: 2019-03-05 | Stop reason: HOSPADM

## 2019-03-05 RX ORDER — DIPHENHYDRAMINE HYDROCHLORIDE 50 MG/ML
12.5 INJECTION, SOLUTION INTRAMUSCULAR; INTRAVENOUS AS NEEDED
Status: DISCONTINUED | OUTPATIENT
Start: 2019-03-05 | End: 2019-03-05 | Stop reason: HOSPADM

## 2019-03-05 RX ORDER — ONDANSETRON 2 MG/ML
4 INJECTION INTRAMUSCULAR; INTRAVENOUS AS NEEDED
Status: DISCONTINUED | OUTPATIENT
Start: 2019-03-05 | End: 2019-03-05 | Stop reason: HOSPADM

## 2019-03-05 RX ORDER — PROMETHAZINE HYDROCHLORIDE 25 MG/ML
INJECTION, SOLUTION INTRAMUSCULAR; INTRAVENOUS AS NEEDED
Status: DISCONTINUED | OUTPATIENT
Start: 2019-03-05 | End: 2019-03-05 | Stop reason: HOSPADM

## 2019-03-05 RX ORDER — MIDAZOLAM HYDROCHLORIDE 1 MG/ML
0.5 INJECTION, SOLUTION INTRAMUSCULAR; INTRAVENOUS
Status: DISCONTINUED | OUTPATIENT
Start: 2019-03-05 | End: 2019-03-05 | Stop reason: HOSPADM

## 2019-03-05 RX ORDER — ONDANSETRON 2 MG/ML
INJECTION INTRAMUSCULAR; INTRAVENOUS AS NEEDED
Status: DISCONTINUED | OUTPATIENT
Start: 2019-03-05 | End: 2019-03-05 | Stop reason: HOSPADM

## 2019-03-05 RX ORDER — SODIUM CHLORIDE 0.9 % (FLUSH) 0.9 %
5-40 SYRINGE (ML) INJECTION AS NEEDED
Status: DISCONTINUED | OUTPATIENT
Start: 2019-03-05 | End: 2019-03-05 | Stop reason: HOSPADM

## 2019-03-05 RX ORDER — ROCURONIUM BROMIDE 10 MG/ML
INJECTION, SOLUTION INTRAVENOUS AS NEEDED
Status: DISCONTINUED | OUTPATIENT
Start: 2019-03-05 | End: 2019-03-05 | Stop reason: HOSPADM

## 2019-03-05 RX ORDER — FENTANYL CITRATE 50 UG/ML
25 INJECTION, SOLUTION INTRAMUSCULAR; INTRAVENOUS
Status: DISCONTINUED | OUTPATIENT
Start: 2019-03-05 | End: 2019-03-05 | Stop reason: HOSPADM

## 2019-03-05 RX ORDER — FENTANYL CITRATE 50 UG/ML
INJECTION, SOLUTION INTRAMUSCULAR; INTRAVENOUS AS NEEDED
Status: DISCONTINUED | OUTPATIENT
Start: 2019-03-05 | End: 2019-03-05 | Stop reason: HOSPADM

## 2019-03-05 RX ORDER — HYDROMORPHONE HYDROCHLORIDE 1 MG/ML
0.2 INJECTION, SOLUTION INTRAMUSCULAR; INTRAVENOUS; SUBCUTANEOUS
Status: DISCONTINUED | OUTPATIENT
Start: 2019-03-05 | End: 2019-03-05 | Stop reason: HOSPADM

## 2019-03-05 RX ORDER — SODIUM CHLORIDE, SODIUM LACTATE, POTASSIUM CHLORIDE, CALCIUM CHLORIDE 600; 310; 30; 20 MG/100ML; MG/100ML; MG/100ML; MG/100ML
125 INJECTION, SOLUTION INTRAVENOUS CONTINUOUS
Status: DISCONTINUED | OUTPATIENT
Start: 2019-03-05 | End: 2019-03-05 | Stop reason: HOSPADM

## 2019-03-05 RX ORDER — MIDAZOLAM HYDROCHLORIDE 1 MG/ML
1 INJECTION, SOLUTION INTRAMUSCULAR; INTRAVENOUS AS NEEDED
Status: DISCONTINUED | OUTPATIENT
Start: 2019-03-05 | End: 2019-03-05 | Stop reason: HOSPADM

## 2019-03-05 RX ORDER — DEXAMETHASONE SODIUM PHOSPHATE 4 MG/ML
INJECTION, SOLUTION INTRA-ARTICULAR; INTRALESIONAL; INTRAMUSCULAR; INTRAVENOUS; SOFT TISSUE AS NEEDED
Status: DISCONTINUED | OUTPATIENT
Start: 2019-03-05 | End: 2019-03-05 | Stop reason: HOSPADM

## 2019-03-05 RX ORDER — PROPOFOL 10 MG/ML
INJECTION, EMULSION INTRAVENOUS AS NEEDED
Status: DISCONTINUED | OUTPATIENT
Start: 2019-03-05 | End: 2019-03-05 | Stop reason: HOSPADM

## 2019-03-05 RX ORDER — HYDROMORPHONE HYDROCHLORIDE 2 MG/1
2 TABLET ORAL ONCE
Status: DISCONTINUED | OUTPATIENT
Start: 2019-03-05 | End: 2019-03-05

## 2019-03-05 RX ORDER — SODIUM CHLORIDE 9 MG/ML
250 INJECTION, SOLUTION INTRAVENOUS AS NEEDED
Status: DISCONTINUED | OUTPATIENT
Start: 2019-03-05 | End: 2019-03-05 | Stop reason: HOSPADM

## 2019-03-05 RX ORDER — MORPHINE SULFATE 10 MG/ML
2 INJECTION, SOLUTION INTRAMUSCULAR; INTRAVENOUS
Status: DISCONTINUED | OUTPATIENT
Start: 2019-03-05 | End: 2019-03-05 | Stop reason: HOSPADM

## 2019-03-05 RX ORDER — ACETAMINOPHEN 10 MG/ML
INJECTION, SOLUTION INTRAVENOUS AS NEEDED
Status: DISCONTINUED | OUTPATIENT
Start: 2019-03-05 | End: 2019-03-05 | Stop reason: HOSPADM

## 2019-03-05 RX ORDER — SUCCINYLCHOLINE CHLORIDE 20 MG/ML
INJECTION INTRAMUSCULAR; INTRAVENOUS AS NEEDED
Status: DISCONTINUED | OUTPATIENT
Start: 2019-03-05 | End: 2019-03-05 | Stop reason: HOSPADM

## 2019-03-05 RX ORDER — FENTANYL CITRATE 50 UG/ML
50 INJECTION, SOLUTION INTRAMUSCULAR; INTRAVENOUS AS NEEDED
Status: DISCONTINUED | OUTPATIENT
Start: 2019-03-05 | End: 2019-03-05 | Stop reason: HOSPADM

## 2019-03-05 RX ORDER — SCOLOPAMINE TRANSDERMAL SYSTEM 1 MG/1
PATCH, EXTENDED RELEASE TRANSDERMAL AS NEEDED
Status: DISCONTINUED | OUTPATIENT
Start: 2019-03-05 | End: 2019-03-05 | Stop reason: HOSPADM

## 2019-03-05 RX ORDER — BUPIVACAINE HYDROCHLORIDE AND EPINEPHRINE 2.5; 5 MG/ML; UG/ML
30 INJECTION, SOLUTION EPIDURAL; INFILTRATION; INTRACAUDAL; PERINEURAL ONCE
Status: COMPLETED | OUTPATIENT
Start: 2019-03-05 | End: 2019-03-05

## 2019-03-05 RX ORDER — ROPIVACAINE HYDROCHLORIDE 5 MG/ML
30 INJECTION, SOLUTION EPIDURAL; INFILTRATION; PERINEURAL ONCE
Status: DISCONTINUED | OUTPATIENT
Start: 2019-03-05 | End: 2019-03-05 | Stop reason: HOSPADM

## 2019-03-05 RX ORDER — SODIUM CHLORIDE, SODIUM LACTATE, POTASSIUM CHLORIDE, CALCIUM CHLORIDE 600; 310; 30; 20 MG/100ML; MG/100ML; MG/100ML; MG/100ML
INJECTION, SOLUTION INTRAVENOUS
Status: DISCONTINUED | OUTPATIENT
Start: 2019-03-05 | End: 2019-03-05 | Stop reason: HOSPADM

## 2019-03-05 RX ORDER — PROMETHAZINE HYDROCHLORIDE 25 MG/ML
25 INJECTION, SOLUTION INTRAMUSCULAR; INTRAVENOUS
Status: DISCONTINUED | OUTPATIENT
Start: 2019-03-05 | End: 2019-03-05 | Stop reason: HOSPADM

## 2019-03-05 RX ORDER — SODIUM CHLORIDE, SODIUM LACTATE, POTASSIUM CHLORIDE, CALCIUM CHLORIDE 600; 310; 30; 20 MG/100ML; MG/100ML; MG/100ML; MG/100ML
75 INJECTION, SOLUTION INTRAVENOUS CONTINUOUS
Status: DISCONTINUED | OUTPATIENT
Start: 2019-03-05 | End: 2019-03-05 | Stop reason: HOSPADM

## 2019-03-05 RX ORDER — LIDOCAINE HYDROCHLORIDE 10 MG/ML
0.1 INJECTION, SOLUTION EPIDURAL; INFILTRATION; INTRACAUDAL; PERINEURAL AS NEEDED
Status: DISCONTINUED | OUTPATIENT
Start: 2019-03-05 | End: 2019-03-05 | Stop reason: HOSPADM

## 2019-03-05 RX ORDER — HYDROMORPHONE HYDROCHLORIDE 2 MG/1
2-4 TABLET ORAL
Qty: 50 TAB | Refills: 0 | Status: SHIPPED | OUTPATIENT
Start: 2019-03-05 | End: 2019-03-15

## 2019-03-05 RX ORDER — LIDOCAINE HYDROCHLORIDE 20 MG/ML
INJECTION, SOLUTION EPIDURAL; INFILTRATION; INTRACAUDAL; PERINEURAL AS NEEDED
Status: DISCONTINUED | OUTPATIENT
Start: 2019-03-05 | End: 2019-03-05 | Stop reason: HOSPADM

## 2019-03-05 RX ADMIN — SUCCINYLCHOLINE CHLORIDE 100 MG: 20 INJECTION INTRAMUSCULAR; INTRAVENOUS at 11:34

## 2019-03-05 RX ADMIN — FENTANYL CITRATE 50 MCG: 50 INJECTION, SOLUTION INTRAMUSCULAR; INTRAVENOUS at 12:54

## 2019-03-05 RX ADMIN — LIDOCAINE HYDROCHLORIDE 100 MG: 20 INJECTION, SOLUTION EPIDURAL; INFILTRATION; INTRACAUDAL; PERINEURAL at 11:36

## 2019-03-05 RX ADMIN — PROPOFOL 50 MG: 10 INJECTION, EMULSION INTRAVENOUS at 12:54

## 2019-03-05 RX ADMIN — FENTANYL CITRATE 100 MCG: 50 INJECTION, SOLUTION INTRAMUSCULAR; INTRAVENOUS at 11:34

## 2019-03-05 RX ADMIN — ONDANSETRON 4 MG: 2 INJECTION INTRAMUSCULAR; INTRAVENOUS at 14:26

## 2019-03-05 RX ADMIN — SCOLOPAMINE TRANSDERMAL SYSTEM 1 PATCH: 1 PATCH, EXTENDED RELEASE TRANSDERMAL at 11:32

## 2019-03-05 RX ADMIN — ONDANSETRON 4 MG: 2 INJECTION INTRAMUSCULAR; INTRAVENOUS at 11:41

## 2019-03-05 RX ADMIN — SODIUM CHLORIDE, SODIUM LACTATE, POTASSIUM CHLORIDE, CALCIUM CHLORIDE: 600; 310; 30; 20 INJECTION, SOLUTION INTRAVENOUS at 11:30

## 2019-03-05 RX ADMIN — ROCURONIUM BROMIDE 30 MG: 10 INJECTION, SOLUTION INTRAVENOUS at 11:34

## 2019-03-05 RX ADMIN — FENTANYL CITRATE 50 MCG: 50 INJECTION, SOLUTION INTRAMUSCULAR; INTRAVENOUS at 12:43

## 2019-03-05 RX ADMIN — ACETAMINOPHEN 1000 MG: 10 INJECTION, SOLUTION INTRAVENOUS at 12:14

## 2019-03-05 RX ADMIN — PROPOFOL 200 MG: 10 INJECTION, EMULSION INTRAVENOUS at 11:36

## 2019-03-05 RX ADMIN — SODIUM CHLORIDE, SODIUM LACTATE, POTASSIUM CHLORIDE, AND CALCIUM CHLORIDE 125 ML/HR: 600; 310; 30; 20 INJECTION, SOLUTION INTRAVENOUS at 11:04

## 2019-03-05 RX ADMIN — PROPOFOL 50 MG: 10 INJECTION, EMULSION INTRAVENOUS at 12:20

## 2019-03-05 RX ADMIN — DEXAMETHASONE SODIUM PHOSPHATE 4 MG: 4 INJECTION, SOLUTION INTRA-ARTICULAR; INTRALESIONAL; INTRAMUSCULAR; INTRAVENOUS; SOFT TISSUE at 11:41

## 2019-03-05 RX ADMIN — PROMETHAZINE HYDROCHLORIDE 25 MG: 25 INJECTION, SOLUTION INTRAMUSCULAR; INTRAVENOUS at 11:41

## 2019-03-05 RX ADMIN — ROCURONIUM BROMIDE 10 MG: 10 INJECTION, SOLUTION INTRAVENOUS at 12:10

## 2019-03-05 NOTE — DISCHARGE INSTRUCTIONS
Post-Operative Instructions      1. Diet:  Consume a high fiber diet as tolerated. Such a diet may include fresh fruits, vegetables, and whole grain cereals and breads. You should also drink 8-10 glasses of water, juice, or other non-alcoholic beverages per day. 2. Fiber Supplements:  Take 1 dose of Metamucil or other over-the-counter fiber supplement (Citrucel, BeneFiber, etc.) as directed each morning and another dose each evening. 3. Medications: Take prescription pain medication (hydromorphone/Dilaudid) as prescribed. Do not drive, drink alcohol, or operate machinery while taking the Dilaudid. Take docusate (non-prescription stool softener) twice per day as directed. Beginning today, take maximum doses of acetaminophen (1000 mg every 6 hours). Doing so will help you to manage the pain and to use less of the Dilaudid. Beginning on the second day after surgery, you may take ibuprofen as directed in addition to or instead of the prescription medication if there has been no significant bleeding. Do not take pain medication on an empty stomach. Do not take aspirin for 10 days following the procedure. 4. Activity:  Do not engage in any heavy lifting or other strenuous activity until you have been seen for follow-up. You may walk as much as you want, and you may climb stairs. Frequent walking will help prevent constipation. 5. Sitz Baths (soaking):  Remove the dressing on the first day after surgery or just before your next bowel movement (whichever comes first), then begin performing sitz baths 3 times per day and after bowel movements. The water should be very warm, and you should soak for no longer than 20 minutes at a time. Do not wipe the anal area with dry toilet tissue; instead, use baby wipes. After sitz baths, loosely cover the anal area with a gauze dressing held in place by your underwear.   You may also apply Neosporin, Neosporin + Pain Relief, or a non-prescription topical anesthetic such as lidocaine or Nupercainal ointment. 6. Constipation:  If more than one day passes without a bowel movement, take 1 Tablespoon of Milk of Magnesia. Repeat in 6 hours if you have no results. If taking the second dose of the Milk of Magnesia does not result in a bowel movement, take two to four 5 mg Dulcolax (bisacodyl) tablets. If you have not had a bowel movement by the next morning after taking the bisacodyl, call my office. 7. Bleeding:  A small amount of bright red bleeding can be expected for the next several days. If bleeding appears to be continuous, call my office. 8. Other Problems:  If you experience intolerable pain, fever (temperature of 101 or more), or inability to urinate, call my office. 9. Questions: If you have any questions about your post-operative condition or care, do not hesitate to call my office. 10. Work:  You may return to work in two weeks. 11.  Other: For anything other that scheduling, please call [phone]. 12.  Follow-up:  Please return to my office at 9:15 AM on Tuesday 3/19/2019. If you need to change the appointment, please call [phone] on a weekday between 9:00 AM and noon. Renita Whatley M.D.  [phone]    ______________________________________________________________________    Anesthesia Discharge Instructions    After general anesthesia or intervenous sedation, for 24 hours or while taking prescription Narcotics:  · Limit your activities  · Do not drive or operate hazardous machinery  · If you have not urinated within 8 hours after discharge, please contact your surgeon on call.   · Do not make important personal or business decisions  · Do not drink alcoholic beverages    Report the following to your surgeon:  · Excessive pain, swelling, redness or odor of or around the surgical area  · Temperature over 100.5 degrees  · Nausea and vomiting lasting longer than 4 hours or if unable to take medication  · Any signs of decreased circulation or nerve impairment to extremity:  Change in color, persistent numbness, tingling, coldness or increased pain.   · Any questions

## 2019-03-05 NOTE — H&P
History and Physical (outpatient)    Patient: Henna Mckee 63 y.o. male     Chief Complaint:  Bleeding hemorrhoids. History of Present Illness: The patient is a 81-year-old male who has been experiencing rectal bleeding for a few days at a time approximately twice per year for the last 10 years. He usually moves his bowels twice per day without straining, but recently the stools have been harder. He has swelling of tissue in the anal area and possibly protrusion of tissue from the anus that he has tried to manually reduce. Recently, he had a bloody bowel movement that was like the [employer] in the toilet. He was hospitalized from 2/6/2019 to 2/8/2019 after presenting to the ER with anemia (hemoglobin 6.0 g/dL) and shortness of breath. He was transfused with 2 units of PRBCs, and his hemoglobin increased to 7.9 g/dL on 2/8/2019. He completed a bowel preparation, and Dr. Aysha Gandhi performed an EGD and a colonoscopy on him on 2/8/2019. The findings included a small hiatal hernia, mild sigmoid diverticulosis, and large internal hemorrhoids. The anemia was attributed to hemorrhoidal bleeding, and he was discharged with a plan for a surgical consultation. He followed up with his primary physician on 2/12/2019 and blood was sent for a hemoglobin check. The hemoglobin was found to be 6.3 g/dL, and he was instructed to go the ER for a transfusion. He did that in the morning on 2/13/2019, receiving one unit of PRBCs, and then he came from there to my office for a previously scheduled appointment with me. His hemoglobin in the ER was 7.1 g/dL. Post-transfusion, he was feeling a bit better. Examination of the perineum revealed a posterior midline external hemorrhoidal tag associated with a superficial posterior midline anal fissure, external hemorrhoidal enlargement, grossly normal anal closure, and no protrusion of tissue form the anus with straining in the left lateral decubitus position. Digital rectal examination revealed grossly normal resting sphincter tone and no masses, and anoscopy revealed massive internal hemorrhoidal enlargement.       History:  Past Medical History:   Diagnosis Date    Chronic pain     Diverticulosis of colon     GERD (gastroesophageal reflux disease)     Hemorrhoids     Sleep apnea     CPAP       Past Surgical History:   Procedure Laterality Date    COLONOSCOPY N/A 2/8/2019    COLON[facility] Luzmaria Perez [facility] at [facility] ENDOSCOPY    HX ENDOSCOPY      HX GI      UPPER ENDOSCOPY AND COLONOSCOPY    HX HEENT      WISDOM TEETH    HX HIP REPLACEMENT Right 2016    total       Family History   Problem Relation Age of Onset    Heart Disease Mother         CHF    Hypertension Mother     Alzheimer Mother     Lung Disease Father         COPD    Alcohol abuse Father         27 YRS AGO    No Known Problems Sister     Heart Attack Maternal Grandmother     Cancer Maternal Grandfather         COLON    Heart Attack Paternal Grandmother     Heart Attack Paternal Grandfather     No Known Problems Sister     Heart Disease Sister         VAVE REPLACED 21 YRS AGO    Anesth Problems Neg Hx      Social History     Socioeconomic History    Marital status:      Spouse name: Not on file    Number of children: Not on file    Years of education: Not on file    Highest education level: Not on file   Social Needs    Financial resource strain: Not on file    Food insecurity - worry: Not on file    Food insecurity - inability: Not on file   [employer] needs - medical: Not on file   [employer] needs - non-medical: Not on file   Occupational History    Not on file   Tobacco Use    Smoking status: Never Smoker    Smokeless tobacco: Never Used   Substance and Sexual Activity    Alcohol use: Yes     Comment: 2 DRINKS PER WEEK    Drug use: No    Sexual activity: Not on file   Other Topics Concern    Not on file   Social History Narrative    Not on file       Allergies: Allergies   Allergen Reactions    Adhesive Tape-Silicones Rash       Current Medications:  Prior to Admission Medications   Prescriptions Last Dose Informant Patient Reported? Taking? B.infantis-B.ani-B.long-B.bifi (PROBIOTIC 4X) 10-15 mg TbEC 2/26/2019 at Unknown time  Yes Yes   Sig: Take  by mouth daily. MULTIVITAMIN PO 2/26/2019 at Unknown time  Yes Yes   Sig: Take  by mouth daily. OTHER 2/26/2019 at Unknown time  Yes Yes   ascorbic acid, vitamin C, (VITAMIN C) 500 mg tablet 2/26/2019 at Unknown time  Yes Yes   Sig: Take 1,000 mg by mouth daily. calcium carbonate (CALTREX) 600 mg calcium (1,500 mg) tablet Unknown at Unknown time  Yes No   Sig: Take 600 mg by mouth two (2) times a day. cholecalciferol (VITAMIN D3) 1,000 unit cap 2/26/2019 at Unknown time  Yes Yes   Sig: Take 6,000 Units by mouth daily. coenzyme Q-10 (CO Q-10) 200 mg capsule 2/26/2019 at Unknown time  Yes Yes   Sig: Take 200 mg by mouth daily. docusate sodium (COLACE) 100 mg capsule   Yes Yes   Sig: Take 100 mg by mouth two (2) times a day. ergocalciferol (VITAMIN D2) 50,000 unit capsule 2/26/2019 at Unknown time  Yes Yes   Sig: Take 50,000 Units by mouth every thirty (30) days. ferrous sulfate (IRON) 325 mg (65 mg iron) tablet 3/4/2019 at Unknown time  No Yes   Sig: Take 1 Tab by mouth Daily (before breakfast). loratadine (CLARITIN) 10 mg tablet 2/26/2019 at Unknown time  Yes Yes   Sig: Take 10 mg by mouth daily as needed for Allergies. magnesium oxide (MAG-OX) 400 mg tablet 2/26/2019 at Unknown time  Yes Yes   Sig: Take 400 mg by mouth daily. milk thistle 500 mg cap 2/26/2019 at Unknown time  Yes Yes   Sig: Take  by mouth. omega-3 acid ethyl esters (LOVAZA) 1 gram capsule 2/26/2019 at Unknown time  Yes Yes   Sig: Take 1 g by mouth every fourty-eight (48) hours. phytonadione, vitamin K1, (MEPHYTON) 5 mg tablet 2/26/2019 at Unknown time  Yes Yes   Sig: Take  by mouth now.    turmeric (CURCUMIN) Yes Yes   Si mg by Does Not Apply route. zinc 50 mg tab tablet   Yes Yes   Sig: Take  by mouth daily. Facility-Administered Medications: None       Current Facility-Administered Medications   Medication Dose Route Frequency    lactated Ringers infusion  125 mL/hr IntraVENous CONTINUOUS    0.9% sodium chloride infusion  25 mL/hr IntraVENous CONTINUOUS    sodium chloride (NS) flush 5-40 mL  5-40 mL IntraVENous Q8H    sodium chloride (NS) flush 5-40 mL  5-40 mL IntraVENous PRN    lidocaine (PF) (XYLOCAINE) 10 mg/mL (1 %) injection 0.1 mL  0.1 mL SubCUTAneous PRN    fentaNYL citrate (PF) injection 50 mcg  50 mcg IntraVENous PRN    midazolam (VERSED) injection 1 mg  1 mg IntraVENous PRN    midazolam (VERSED) injection 1 mg  1 mg IntraVENous PRN    ropivacaine (PF) (NAROPIN) 5 mg/mL (0.5 %) injection 150 mg  30 mL Peripheral Nerve Block ONCE    0.9% sodium chloride infusion 250 mL  250 mL IntraVENous PRN            Physical Exam:  Blood pressure 123/56, temperature 99.4 °F (37.4 °C), resp. rate 16, height 5' 10\" (1.778 m), weight 106.6 kg (235 lb), SpO2 100 %. GENERAL:  No apparent distress. LUNGS:  Clear to auscultation bilaterally. HEART:  Regular rate and rhythm with no murmurs, gallops, or rubs. NEUROLOGIC: Alert and oriented. No gross deficits. Alerts:    Hospital Problems  Date Reviewed: 2019    None          Laboratory:    No results for input(s): HGB, HCT, WBC, PLT, INR, BUN, CREA, K, CRCLT, HGBEXT, HCTEXT, PLTEXT in the last 72 hours. No lab exists for component: PTT, PT, INREXT    Assessment and Plan: The bleeding has been hemorrhoidal, and hemorrhoid surgery is indicated to stop it. The risks of the operation have been discussed in detail, and the patient has agreed to proceed.

## 2019-03-05 NOTE — ANESTHESIA PREPROCEDURE EVALUATION
Anesthetic History   No history of anesthetic complications            Review of Systems / Medical History  Patient summary reviewed, nursing notes reviewed and pertinent labs reviewed    Pulmonary        Sleep apnea: CPAP           Neuro/Psych   Within defined limits           Cardiovascular  Within defined limits                     GI/Hepatic/Renal     GERD           Endo/Other        Arthritis  Pertinent negatives: No morbid obesity   Other Findings            Physical Exam    Airway  Mallampati: II  TM Distance: > 6 cm  Neck ROM: normal range of motion   Mouth opening: Normal     Cardiovascular  Regular rate and rhythm,  S1 and S2 normal,  no murmur, click, rub, or gallop             Dental  No notable dental hx       Pulmonary  Breath sounds clear to auscultation               Abdominal  GI exam deferred       Other Findings            Anesthetic Plan    ASA: 2  Anesthesia type: general          Induction: Intravenous  Anesthetic plan and risks discussed with: Patient

## 2019-03-05 NOTE — BRIEF OP NOTE
BRIEF OPERATIVE NOTE    Date of Procedure:  3/4/2019   Preoperative Diagnosis:  Bleeding hemorrhoids. Postoperative Diagnosis:  Bleeding hemorrhoids. Anal fissure. Procedure:  Extensive internal and external hemorrhoidectomies. Surgeon:  Nany Mccabe MD  Assistant:  Alla Awan SA  Anesthesia:  General endotracheal  Estimated Blood Loss:  75 mL  Crystalloid:  600 mL    Specimens:   ID Type Source Tests Collected by Time Destination   Hemorrhoids Fresh Rectum  Radha Up MD 3/5/2019 1314 Pathology     Tubes and Drains:  None. Findings:  Massive internal hemorrhoids and markedly enlarged external hemorrhoids. Chronic posterior midlline anal fissure. Complications: None apparent. Implants:  None.

## 2019-03-05 NOTE — ANESTHESIA POSTPROCEDURE EVALUATION
Procedure(s): HEMORRHOIDECTOMY. Anesthesia Post Evaluation      Multimodal analgesia: multimodal analgesia used between 6 hours prior to anesthesia start to PACU discharge  Patient location during evaluation: bedside  Patient participation: waiting for patient participation  Level of consciousness: awake  Pain management: adequate  Airway patency: patent  Anesthetic complications: no  Cardiovascular status: acceptable  Respiratory status: unassisted  Hydration status: acceptable  Comments: Post-Anesthesia Evaluation and Assessment    I have evaluated the patient and they are ready for PACU discharge. Patient: Saumya Curry MRN: [id number]  SSN: [id number]   YOB: 1960  Age: 63 y.o. Sex: male      Cardiovascular Function/Vital Signs  /75 (BP 1 Location: Right arm, BP Patient Position: At rest)   Pulse 89   Temp 36.3 °C (97.4 °F)   Resp 16   Ht 5' 10\" (1.778 m)   Wt 106.6 kg (235 lb)   SpO2 99%   BMI 33.72 kg/m²     Patient is status post General anesthesia for Procedure(s): HEMORRHOIDECTOMY. Nausea/Vomiting: None    Postoperative hydration reviewed and adequate. Pain:  Pain Scale 1: Numeric (0 - 10) (03/05/19 1330)  Pain Intensity 1: 0 (03/05/19 1330)   Managed    Neurological Status:   Neuro (WDL): Within Defined Limits (03/05/19 1330)  Neuro  Neurologic State: Appropriate for age;Drowsy; Pharmacologically induced (comment) (03/05/19 1330)  LUE Motor Response: Purposeful (03/05/19 1330)  LLE Motor Response: Purposeful (03/05/19 1330)  RUE Motor Response: Purposeful (03/05/19 1330)  RLE Motor Response: Purposeful (03/05/19 1330)   At baseline    Mental Status, Level of Consciousness: Alert and  oriented to person, place, and time    Pulmonary Status:   O2 Device: Nasal cannula (03/05/19 1330)   Adequate oxygenation and airway patent    Complications related to anesthesia: None    Post-anesthesia assessment completed.  No concerns    Signed By: MD Lin Vo 5, 2019                   Visit Vitals  /75 (BP 1 Location: Right arm, BP Patient Position: At rest)   Pulse 89   Temp 36.3 °C (97.4 °F)   Resp 16   Ht 5' 10\" (1.778 m)   Wt 106.6 kg (235 lb)   SpO2 99%   BMI 33.72 kg/m²

## 2019-03-06 NOTE — OP NOTES
[address]   OPERATIVE REPORT    Name:  Calin Odell  MR#:  [id number]  :  1960  ACCOUNT #:  [de-identified]    DATE OF SERVICE:  2019    PREOPERATIVE DIAGNOSIS:  Bleeding hemorrhoids. POSTOPERATIVE DIAGNOSES:  1. Bleeding hemorrhoids. 2.  Anal fissure. PROCEDURE:  Extensive internal and external hemorrhoidectomies. SURGEON:  Dulce Young MD    ASSISTANT:  Pauline Purdy SA    ANESTHESIA:  General endotracheal.    SPECIMENS:  Hemorrhoids. TUBES AND DRAINS:  None. ESTIMATED BLOOD LOSS:  75 mL. CRYSTALLOID:  600 mL. COMPLICATIONS:  None apparent. IMPLANTS:  None. INDICATIONS FOR PROCEDURE:  The patient is a 81-year-old male who has been experiencing rectal bleeding for a few days at a time approximately twice per year for the last 10 years. He has swelling of tissue in the anal area and possibly protrusion of tissue from the anus that he has tried to manually reduce. Recently, the amount of bleeding seems to have increased. He was hospitalized from 2019 to 2019 after presenting to the emergency room with anemia (hemoglobin 6.0 g/dL) and shortness of breath. He was transfused with 2 units of packed red blood cells, and his hemoglobin increased to 7.9 g/dL on 2019. He completed a bowel preparation, and Dr. Damon Mills performed an EGD and colonoscopy on him on 2019. The findings included a small hiatal hernia, mild sigmoid diverticulosis, and large internal hemorrhoids. The anemia was attributed to hemorrhoidal bleeding, and he was discharged with a plan for surgical consultation. He followed up with his primary physician on 2019 and blood was sent for a hemoglobin check. The hemoglobin was found to be 6.3 g/dL, and he was instructed to go to the ER for a transfusion.   He did that in the morning on 2019, receiving 1 unit of packed red blood cells, and then came from there to my office for the previously scheduled appointment. His hemoglobin in the emergency room was 7.1 g/dL. Post transfusion, he was feeling a bit better. Examination of the perineum revealed a posterior midline external hemorrhoidal tag associated with a superficial posterior midline anal fissure, external hemorrhoidal enlargement, and massive internal hemorrhoidal enlargement. Hemorrhoid surgery was recommended and scheduled for 03/04/2019. In the interim, he received the transfusion of 2 more units of packed red blood cells on 02/20/2019. He was referred for a Hematology consultation with Dr. Catracho Parada, and on 03/01/2019 his hemoglobin was 10.3 g/dL. The risks of the hemorrhoid surgery had been discussed in detail, and he had agreed to proceed. OPERATIVE FINDINGS:  There were massive internal hemorrhoids and markedly enlarged external hemorrhoids. There was a chronic posterior midline anal fissure. DESCRIPTION OF PROCEDURE:  After informed consent was obtained, the patient was taken to the operating room where standard monitoring devices were attached and adequate general endotracheal anesthesia was induced. He was then placed in the prone jackknife position on the operating table with all pressure points padded appropriately and with the lower extremities fitted with pneumatic compression stockings. The buttocks were retracted bilaterally using Mastisol and tape. The perineum was prepped and draped in the usual sterile fashion. Visual inspection and digital rectal examination were performed. Exposure within the anal canal was facilitated using the large Hill-Khan retractor. That retractor was used throughout the case and it could still be inserted without difficulty at the conclusion of the operation. There were three major areas of internal hemorrhoidal disease that required excision.   One was in the left lateral position, one was in the anterior quadrant, and the other was occupying most of the right lateral quadrant and represented the merging of the right posterior and right anterior internal hemorrhoidal columns. Each of these areas of disease was dealt with using a combination of sharp dissection and also excision using the LigaSure small jaw. 3-0 Vicryl sutures were placed in running and interrupted fashion to close the wounds and to achieve hemostasis. The excision was performed so as to limit the likelihood of stricture formation. Once the internal disease was taken care of, the external disease was addressed. Here, on both the left and the right sides, sharp excision was performed using a scalpel to incise the overlying epithelium and anoderm, then tenotomy scissors to dissect out the hemorrhoidal vessels in the subcutaneous and submucosal planes. Electrocautery was applied sparingly. The resulting wounds were closed with running and interrupted 3-0 Vicryl sutures. Final inspection revealed good hemostasis. 0.25% bupivacaine with epinephrine was infiltrated to provide some postoperative analgesia. Three small strips of Gelfoam were inserted into the anal canal, and an external dressing consisting of Xeroform, 4x4s, and an ABD was put in place. There were no apparent complications, and the patient appeared to have tolerated the procedure well. At its conclusion, he was extubated and transported in stable condition to the recovery room.           Cami Terry MD      [id number]  D:  03/05/2019 17:24  T:  03/06/2019 14:16  JOB #:  [id number]  CC:  MD Xavier Torres DO Griffith Dales, MD

## 2019-10-03 ENCOUNTER — OFFICE VISIT (OUTPATIENT)
Dept: DERMATOLOGY | Facility: AMBULATORY SURGERY CENTER | Age: 59
End: 2019-10-03

## 2019-10-03 VITALS
TEMPERATURE: 98.6 F | BODY MASS INDEX: 33.64 KG/M2 | OXYGEN SATURATION: 94 % | HEART RATE: 66 BPM | SYSTOLIC BLOOD PRESSURE: 112 MMHG | RESPIRATION RATE: 16 BRPM | WEIGHT: 235 LBS | DIASTOLIC BLOOD PRESSURE: 64 MMHG | HEIGHT: 70 IN

## 2019-10-03 DIAGNOSIS — D18.01 CHERRY ANGIOMA: ICD-10-CM

## 2019-10-03 DIAGNOSIS — L84 CALLUS: ICD-10-CM

## 2019-10-03 DIAGNOSIS — D22.9 MULTIPLE BENIGN NEVI: ICD-10-CM

## 2019-10-03 DIAGNOSIS — L82.1 SEBORRHEIC KERATOSES: Primary | ICD-10-CM

## 2019-10-03 NOTE — PROGRESS NOTES
Room 7    Identified pt with two pt identifiers(name and ). Reviewed record in preparation for visit and have obtained necessary documentation. All patient medications has been reviewed. Chief Complaint   Patient presents with    Skin Exam     Pt is c/o moles on lower back        Health Maintenance Due   Topic    Hepatitis C Screening     DTaP/Tdap/Td series (1 - Tdap)    Shingrix Vaccine Age 50> (1 of 2)    Influenza Age 5 to Adult        Vitals:    10/03/19 0835   BP: 112/64   Pulse: 66   Resp: 16   Temp: 98.6 °F (37 °C)   TempSrc: Oral   SpO2: 94%   Weight: 106.6 kg (235 lb)   Height: 5' 10\" (1.778 m)   PainSc:   0 - No pain       Coordination of Care Questionnaire:   1) Have you been to an emergency room, urgent care, or hospitalized since your last visit?   no       2. Have seen or consulted any other health care provider since your last visit? NO      Patient is accompanied by self I have received verbal consent from Cata Maddox to discuss any/all medical information while they are present in the room.

## 2019-10-03 NOTE — PROGRESS NOTES
Written by Kindra Harman, as dictated by Divya Holley, [address]. Name: Sweta Roper       Age: 63 y.o. Date: 10/3/2019    Chief Complaint:   Chief Complaint   Patient presents with    Skin Exam     Pt is c/o moles on lower back        Subjective:    HPI  Mr. Sweta Roper is a 63 y.o. male who presents for a full skin exam.  The patient's last skin exam was on 8/29/18 and the patient does have current complaints related to his skin. The patient would like the lesions on his back and left 1st finger (noted at last visit to be rechecked). He says his wife is concerned about his scalp. He is feeling well and in his usual state of health today. He has no current illnesses, no other skin concerns. His allergies, medications, medical, and social history are reviewed by me today. The patient's pertinent skin history includes : No personal history of skin cancer    ROS: Constitutional: Negative.     Dermatological : positive for - skin lesion changes    Social History     Socioeconomic History    Marital status:      Spouse name: Not on file    Number of children: Not on file    Years of education: Not on file    Highest education level: Not on file   Occupational History    Not on file   Social Needs    Financial resource strain: Not on file    Food insecurity:     Worry: Not on file     Inability: Not on file    Transportation needs:     Medical: Not on file     Non-medical: Not on file   Tobacco Use    Smoking status: Never Smoker    Smokeless tobacco: Never Used   Substance and Sexual Activity    Alcohol use: Yes     Comment: 1-2 per mo     Drug use: No    Sexual activity: Not on file   Lifestyle    Physical activity:     Days per week: Not on file     Minutes per session: Not on file    Stress: Not on file   Relationships    Social connections:     Talks on phone: Not on file     Gets together: Not on file     Attends [employer] service: Not on file Active member of club or organization: Not on file     Attends meetings of clubs or organizations: Not on file     Relationship status: Not on file    Intimate partner violence:     Fear of current or ex partner: Not on file     Emotionally abused: Not on file     Physically abused: Not on file     Forced sexual activity: Not on file   Other Topics Concern    Not on file   Social History Narrative    Not on file       Family History   Problem Relation Age of Onset    Heart Disease Mother         CHF    Hypertension Mother     Alzheimer Mother     Lung Disease Father         COPD    Alcohol abuse Father         27 YRS AGO    No Known Problems Sister     Heart Attack Maternal Grandmother     Cancer Maternal Grandfather         COLON    Heart Attack Paternal Grandmother     Heart Attack Paternal Grandfather     No Known Problems Sister     Heart Disease Sister         VAVE REPLACED 21 YRS AGO    Anesth Problems Neg Hx        Past Medical History:   Diagnosis Date    Chronic pain     Diverticulosis of colon     GERD (gastroesophageal reflux disease)     Hemorrhoids     Sleep apnea     CPAP       Past Surgical History:   Procedure Laterality Date    COLONOSCOPY N/A 2/8/2019    COL[facility] performed [facility] at [facility] ENDOSCOPY    HX ENDOSCOPY      HX GI      UPPER ENDOSCOPY AND COLONOSCOPY     [address]    total    HX OTHER SURGICAL  02/2019    due to tears in the colon        Current Outpatient Medications   Medication Sig Dispense Refill    coenzyme Q-10 (CO Q-10) 200 mg capsule Take 200 mg by mouth daily.  docusate sodium (COLACE) 100 mg capsule Take 100 mg by mouth two (2) times a day.  zinc 50 mg tab tablet Take  by mouth daily. Indications: OCC      ferrous sulfate (IRON) 325 mg (65 mg iron) tablet Take 1 Tab by mouth Daily (before breakfast).  (Patient taking differently: Take 325 mg by mouth every thirty (30) days.) 30 Tab 0    turmeric (CURCUMIN) 400 mg by Does Not Apply route.  magnesium oxide (MAG-OX) 400 mg tablet Take 400 mg by mouth daily.  phytonadione, vitamin K1, (MEPHYTON) 5 mg tablet Take  by mouth now.  OTHER       loratadine (CLARITIN) 10 mg tablet Take 10 mg by mouth daily as needed for Allergies.  MULTIVITAMIN PO Take  by mouth daily.  B.infantis-B.ani-B.long-B.bifi (PROBIOTIC 4X) 10-15 mg TbEC Take  by mouth daily.  cholecalciferol (VITAMIN D3) 1,000 unit cap Take 6,000 Units by mouth daily.  ascorbic acid, vitamin C, (VITAMIN C) 500 mg tablet Take 1,000 mg by mouth daily.  ergocalciferol (VITAMIN D2) 50,000 unit capsule Take 50,000 Units by mouth every thirty (30) days.  milk thistle 500 mg cap Take  by mouth.  calcium carbonate (CALTREX) 600 mg calcium (1,500 mg) tablet Take 600 mg by mouth two (2) times a day. Allergies   Allergen Reactions    Adhesive Tape-Silicones Rash         Objective:    Visit Vitals  /64 (BP 1 Location: Left arm, BP Patient Position: Sitting)   Pulse 66   Temp 98.6 °F (37 °C) (Oral)   Resp 16   Ht 5' 10\" (1.778 m)   Wt 235 lb (106.6 kg)   SpO2 94%   BMI 33.72 kg/m²       Sunny Shay is a 63 y.o. male who appears well and in no distress. He is awake, alert, and oriented. A skin examination was performed including his scalp, face (including eyelids), ears, neck, chest, back, abdomen, upper extremities (including digits/nails), lower extremities, breasts; genital skin was not examined. He has scattered waxy macules and keratotic papules consistent with seborrheic keratoses, including lesions on his scalp and back. He has pink intradermal nevi and brown junctional nevi, no concerning features for severe atypia. He has scattered red papules consistent with cherry angiomas. On the left thumb, he has a corn/callus, including the lesion of his concern- no concerning features. Assessment/Plan:  1. Seborrheic keratoses. The diagnosis was reviewed and the patient was reassured that no treatment is needed for these benign lesions. 2. Normal nevi. The diagnosis of normal nevi was reviewed. I discussed sun protection, sunscreen use, the warning signs of skin cancer, mole monitoring, the need for self-skin examinations, and the need for regular practitioner exams. The patient should follow up sooner as needed if new, changing, or symptomatic skin lesions arise. 3. Cherry angiomas. The diagnosis was reviewed and the patient was reassured that no treatment is needed for these benign lesions. 4. Corn/callus. The diagnosis was discussed. The patient was reassured that no treatment is necessary at this time. Next skin exam:  1 year    This plan was reviewed with the patient and patient agrees. All questions were answered. This scribe documentation was reviewed by me and accurately reflects the examination and decisions made by me.

## 2022-05-17 NOTE — ED NOTES
[facility] here to transport patient I have reviewed and confirmed nurses' notes for patient's medications, allergies, medical history, and surgical history.

## 2023-12-22 NOTE — ROUTINE PROCESS
Patient: Latia Guardian MRN: [id number]  SSN: [id number]   YOB: 1960  Age: 63 y.o. Sex: male     Patient is status post Procedure(s): HEMORRHOIDECTOMY.     Surgeon(s) and Role:     * Bailey Raza MD - Primary    Local/Dose/Irrigation:  0.25% Marcaine with Epi 16ml                  Peripheral IV 03/05/19 Left Wrist (Active)                           Dressing/Packing:  Wound Anus-Dressing Type : 4 x 4;Xeroform;ABD pad (03/05/19 1300) Hpi Title: Evaluation of a Skin Lesion Additional History: Patient has concerns of itching in skin due to weather and has been using betamethasone but doesn’t help and states the only thing that has helped was a lidocaine topical to help with itching at night

## (undated) DEVICE — SURGICAL PROCEDURE PACK BASIN MAJ SET CUST NO CAUT

## (undated) DEVICE — PREP SKN PREVAIL 40ML APPL --

## (undated) DEVICE — SUTURE MCRYL SZ 2-0 L36IN ABSRB UD L36MM CT-1 1/2 CIR Y945H

## (undated) DEVICE — SCRUB DRY SURG EZ SCRUB BRUSH PREOPERATIVE GRN

## (undated) DEVICE — SPONGE LAP 18X18IN STRL -- 5/PK

## (undated) DEVICE — 1200 GUARD II KIT W/5MM TUBE W/O VAC TUBE: Brand: GUARDIAN

## (undated) DEVICE — REM POLYHESIVE ADULT PATIENT RETURN ELECTRODE: Brand: VALLEYLAB

## (undated) DEVICE — CONNECTOR TBNG AUX H2O JET DISP FOR OLY 160/180 SER

## (undated) DEVICE — SUTURE STRATAFIX SYMMETRIC PDS + SZ 1 L18IN ABSRB VLT L48MM SXPP1A400

## (undated) DEVICE — BAG BELONG PT PERS CLEAR HANDL

## (undated) DEVICE — MASTISOL ADHESIVE LIQ 2/3ML

## (undated) DEVICE — DEVON™ KNEE AND BODY STRAP 60" X 3" (1.5 M X 7.6 CM): Brand: DEVON

## (undated) DEVICE — DRAPE,U/ SHT,SPLIT,PLAS,STERIL: Brand: MEDLINE

## (undated) DEVICE — ABDOMINAL PAD: Brand: DERMACEA

## (undated) DEVICE — PAD,NON-ADHERENT,3X8,STERILE,LF,1/PK: Brand: MEDLINE

## (undated) DEVICE — TRAY CATH 16F URIN MTR LTX -- CONVERT TO ITEM 363111

## (undated) DEVICE — BLADE SAW W073XL276IN THK0031IN CUT THK0036IN REPL SAG

## (undated) DEVICE — NEEDLE HYPO 18GA L1.5IN PNK S STL HUB POLYPR SHLD REG BVL

## (undated) DEVICE — (D)STRIP SKN CLSR 0.5X4IN WHT --

## (undated) DEVICE — AIRLIFE™ U/CONNECT-IT OXYGEN TUBING 7 FEET (2.1 M) CRUSH-RESISTANT OXYGEN TUBING, VINYL TIPPED: Brand: AIRLIFE™

## (undated) DEVICE — Device

## (undated) DEVICE — INFECTION CONTROL KIT SYS

## (undated) DEVICE — TOWEL SURG W17XL27IN STD BLU COT NONFENESTRATED PREWASHED

## (undated) DEVICE — FORCEPS BX L240CM JAW DIA2.8MM L CAP W/ NDL MIC MESH TOOTH

## (undated) DEVICE — DRAPE XR C ARM 41X74IN LF --

## (undated) DEVICE — CATH IV AUTOGRD BC BLU 22GA 25 -- INSYTE

## (undated) DEVICE — QUILTED PREMIUM COMFORT UNDERPAD,EXTRA HEAVY: Brand: WINGS

## (undated) DEVICE — Z DISCONTINUED USE 2744636  DRESSING AQUACEL 14 IN ALG W3.5XL14IN POLYUR FLM CVR W/ HYDRCOLL

## (undated) DEVICE — Z INACTIVE NO USAGE TURNOVER KIT RM CLEANOP

## (undated) DEVICE — CURVED, SMALL JAW, OPEN SEALER/DIVIDER: Brand: LIGASURE

## (undated) DEVICE — 3M™ DURAPORE™ SURGICAL TAPE 1538-3, 3 INCH X 10 YARD (7,5CM X 9,1M), 4 ROLLS/BOX: Brand: 3M™ DURAPORE™

## (undated) DEVICE — GAUZE SPONGES,12 PLY: Brand: CURITY

## (undated) DEVICE — NEONATAL-ADULT SPO2 SENSOR: Brand: NELLCOR

## (undated) DEVICE — SOLUTION IRRIG 1000ML H2O STRL BLT

## (undated) DEVICE — NEEDLE HYPO 21GA L1.5IN INTRAMUSCULAR S STL LATCH BVL UP

## (undated) DEVICE — SOLUTION IV 50ML 0.9% SOD CHL

## (undated) DEVICE — Z DISCONTINUED NO SUB IDED SET EXTN W/ 4 W STPCOCK M SPIN LOK 36IN

## (undated) DEVICE — 4-PORT MANIFOLD: Brand: NEPTUNE 2

## (undated) DEVICE — KENDALL SCD EXPRESS SLEEVES, KNEE LENGTH, MEDIUM: Brand: KENDALL SCD

## (undated) DEVICE — DRAPE,ISOLATION,ACTIGARD, 129"X100": Brand: MEDLINE

## (undated) DEVICE — SOLUTION IRRIG 3000ML 0.9% SOD CHL FLX CONT 0797208] ICU MEDICAL INC]

## (undated) DEVICE — CANN NASAL O2 CAPNOGRAPHY AD -- FILTERLINE

## (undated) DEVICE — HANDPIECE SET WITH BONE CLEANING TIP AND SUCTION TUBE: Brand: INTERPULSE

## (undated) DEVICE — TRAY PREP DRY W/ PREM GLV 2 APPL 6 SPNG 2 UNDPD 1 OVERWRAP

## (undated) DEVICE — SET ADMIN 16ML TBNG L100IN 2 Y INJ SITE IV PIGGY BK DISP

## (undated) DEVICE — SYR 10ML LUER LOK 1/5ML GRAD --

## (undated) DEVICE — SYRINGE MED 20ML STD CLR PLAS LUERLOCK TIP N CTRL DISP

## (undated) DEVICE — SUTURE VCRL SZ 2 L54IN ABSRB UD L65MM TP-1 1/2 CIR J880T

## (undated) DEVICE — SUTURE MCRYL SZ 4 0 L18IN ABSRB VLT PS 1 L24MM 3 8 CIR REV Y682H

## (undated) DEVICE — Z CONVERTED USE 2271043 CONTAINER SPEC COLL 4OZ SCR ON LID PEEL PCH

## (undated) DEVICE — SOLIDIFIER FLUID 3000 CC ABSORB

## (undated) DEVICE — OCCLUSIVE GAUZE STRIP,3% BISMUTH TRIBROMOPHENATE IN PETROLATUM BLEND: Brand: XEROFORM

## (undated) DEVICE — STERILE POLYISOPRENE POWDER-FREE SURGICAL GLOVES: Brand: PROTEXIS

## (undated) DEVICE — ROCKER SWITCH PENCIL BLADE ELECTRODE, HOLSTER: Brand: EDGE

## (undated) DEVICE — Z DISCONTINUED USE 2751540 TUBING IRRIG L10IN DISP PMP ENDOGATOR

## (undated) DEVICE — DBD-PACK,LAPAROTOMY,2 REINFORCED GOWNS: Brand: MEDLINE

## (undated) DEVICE — BAG SPEC BIOHZD LF 2MIL 6X10IN -- CONVERT TO ITEM 357326

## (undated) DEVICE — STERILE POLYISOPRENE POWDER-FREE SURGICAL GLOVES WITH EMOLLIENT COATING: Brand: PROTEXIS

## (undated) DEVICE — NEEDLE HYPO 25GA L1.5IN BVL ORIENTED ECLIPSE

## (undated) DEVICE — SUTURE VCRL SZ 3-0 L27IN ABSRB VLT L26MM SH 1/2 CIR J316H

## (undated) DEVICE — ENDO CARRY-ON PROCEDURE KIT INCLUDES ENZYMATIC SPONGE, GAUZE, BIOHAZARD LABEL, TRAY, LUBRICANT, DIRTY SCOPE LABEL, WATER LABEL, TRAY, DRAWSTRING PAD, AND DEFENDO 4-PIECE KIT.: Brand: ENDO CARRY-ON PROCEDURE KIT

## (undated) DEVICE — Device: Brand: MEDICAL ACTION INDUSTRIES

## (undated) DEVICE — KENDALL RADIOLUCENT FOAM MONITORING ELECTRODE -RECTANGULAR SHAPE: Brand: KENDALL

## (undated) DEVICE — CONTAINER SPEC 20 ML LID NEUT BUFF FORMALIN 10 % POLYPR STS

## (undated) DEVICE — T4 HOOD

## (undated) DEVICE — BITE BLK ENDOSCP AD 54FR GRN POLYETH ENDOSCP W STRP SLD

## (undated) DEVICE — X-RAY SPONGES,16 PLY: Brand: DERMACEA

## (undated) DEVICE — BW-412T DISP COMBO CLEANING BRUSH: Brand: SINGLE USE COMBINATION CLEANING BRUSH

## (undated) DEVICE — BLADE ASSEMB CLP HAIR FINE --

## (undated) DEVICE — SOLUTION IV 1000ML 0.9% SOD CHL

## (undated) DEVICE — SYSTEM SKIN CLSR 22CM DERMBND PRINEO

## (undated) DEVICE — HANDLE LT SNAP ON ULT DURABLE LENS FOR TRUMPF ALC DISPOSABLE

## (undated) DEVICE — PADDING CAST SPEC 6INX4YD COT --

## (undated) DEVICE — SUTURE STRATAFIX SPRL SZ 1 L5IN ABSRB VLT CT-1 L36MM 1/2 SXPD2B401

## (undated) DEVICE — GLOVE SURG SZ 75 L1212IN FNGR THK138MIL BRN LTX FREE